# Patient Record
Sex: FEMALE | Race: BLACK OR AFRICAN AMERICAN | NOT HISPANIC OR LATINO | Employment: PART TIME | URBAN - METROPOLITAN AREA
[De-identification: names, ages, dates, MRNs, and addresses within clinical notes are randomized per-mention and may not be internally consistent; named-entity substitution may affect disease eponyms.]

---

## 2023-03-16 PROBLEM — M79.89 LEG SWELLING: Status: ACTIVE | Noted: 2023-03-16

## 2023-03-16 PROBLEM — E78.5 HYPERLIPIDEMIA: Status: ACTIVE | Noted: 2023-03-16

## 2023-03-16 PROBLEM — G62.9 NEUROPATHY: Status: ACTIVE | Noted: 2023-03-16

## 2023-03-16 PROBLEM — I73.9 CLAUDICATION (CMS-HCC): Status: ACTIVE | Noted: 2023-03-16

## 2023-03-16 PROBLEM — E55.9 VITAMIN D DEFICIENCY: Status: ACTIVE | Noted: 2023-03-16

## 2023-03-16 PROBLEM — M79.605 PAIN OF LEFT LOWER EXTREMITY: Status: ACTIVE | Noted: 2023-03-16

## 2023-03-16 PROBLEM — M54.12 CERVICAL RADICULOPATHY: Status: ACTIVE | Noted: 2023-03-16

## 2023-03-16 PROBLEM — G89.29 CHRONIC RIGHT SHOULDER PAIN: Status: ACTIVE | Noted: 2023-03-16

## 2023-03-16 PROBLEM — M25.511 CHRONIC RIGHT SHOULDER PAIN: Status: ACTIVE | Noted: 2023-03-16

## 2023-03-16 PROBLEM — R00.2 HEART PALPITATIONS: Status: ACTIVE | Noted: 2023-03-16

## 2023-03-16 PROBLEM — R25.2 MUSCLE CRAMPING: Status: ACTIVE | Noted: 2023-03-16

## 2023-03-16 PROBLEM — E66.811 CLASS 1 OBESITY DUE TO EXCESS CALORIES WITH SERIOUS COMORBIDITY AND BODY MASS INDEX (BMI) OF 32.0 TO 32.9 IN ADULT: Status: ACTIVE | Noted: 2023-03-16

## 2023-03-16 PROBLEM — N18.31 CHRONIC KIDNEY DISEASE, STAGE 3A (MULTI): Status: ACTIVE | Noted: 2023-03-16

## 2023-03-16 PROBLEM — E66.09 CLASS 1 OBESITY DUE TO EXCESS CALORIES WITH SERIOUS COMORBIDITY AND BODY MASS INDEX (BMI) OF 32.0 TO 32.9 IN ADULT: Status: ACTIVE | Noted: 2023-03-16

## 2023-03-16 PROBLEM — M25.571 PAIN IN RIGHT ANKLE: Status: ACTIVE | Noted: 2023-03-16

## 2023-03-16 PROBLEM — I73.9 PVD (PERIPHERAL VASCULAR DISEASE) WITH CLAUDICATION (CMS-HCC): Status: ACTIVE | Noted: 2023-03-16

## 2023-03-16 PROBLEM — I10 BENIGN ESSENTIAL HTN: Status: ACTIVE | Noted: 2023-03-16

## 2023-03-16 PROBLEM — K50.90 CROHN DISEASE (MULTI): Status: ACTIVE | Noted: 2023-03-16

## 2023-03-16 PROBLEM — M25.512 LEFT SHOULDER PAIN: Status: ACTIVE | Noted: 2023-03-16

## 2023-03-16 PROBLEM — M54.16 LUMBAR RADICULAR PAIN: Status: ACTIVE | Noted: 2023-03-16

## 2023-03-16 RX ORDER — LISINOPRIL AND HYDROCHLOROTHIAZIDE 12.5; 2 MG/1; MG/1
1 TABLET ORAL DAILY
COMMUNITY
Start: 2019-08-02 | End: 2023-05-10 | Stop reason: SDUPTHER

## 2023-03-16 RX ORDER — ATORVASTATIN CALCIUM 80 MG/1
1 TABLET, FILM COATED ORAL DAILY
COMMUNITY
Start: 2019-08-09 | End: 2023-05-17 | Stop reason: SDUPTHER

## 2023-03-16 RX ORDER — FLUTICASONE PROPIONATE 50 MCG
2 SPRAY, SUSPENSION (ML) NASAL DAILY
COMMUNITY
Start: 2021-02-19

## 2023-03-21 ENCOUNTER — APPOINTMENT (OUTPATIENT)
Dept: PRIMARY CARE | Facility: CLINIC | Age: 73
End: 2023-03-21
Payer: COMMERCIAL

## 2023-05-10 DIAGNOSIS — E78.5 HYPERLIPIDEMIA, UNSPECIFIED HYPERLIPIDEMIA TYPE: ICD-10-CM

## 2023-05-10 DIAGNOSIS — I10 BENIGN ESSENTIAL HTN: Primary | ICD-10-CM

## 2023-05-11 ENCOUNTER — TELEMEDICINE (OUTPATIENT)
Dept: PRIMARY CARE | Facility: CLINIC | Age: 73
End: 2023-05-11
Payer: COMMERCIAL

## 2023-05-11 DIAGNOSIS — R21 RASH: Primary | ICD-10-CM

## 2023-05-11 PROCEDURE — 99442 PR PHYS/QHP TELEPHONE EVALUATION 11-20 MIN: CPT | Performed by: NURSE PRACTITIONER

## 2023-05-22 RX ORDER — LISINOPRIL AND HYDROCHLOROTHIAZIDE 12.5; 2 MG/1; MG/1
1 TABLET ORAL DAILY
Qty: 90 TABLET | Refills: 3 | Status: SHIPPED | OUTPATIENT
Start: 2023-05-22 | End: 2023-09-22 | Stop reason: SINTOL

## 2023-05-22 RX ORDER — ATORVASTATIN CALCIUM 80 MG/1
80 TABLET, FILM COATED ORAL DAILY
Qty: 90 TABLET | Refills: 3 | Status: SHIPPED | OUTPATIENT
Start: 2023-05-22

## 2023-06-05 ENCOUNTER — LAB (OUTPATIENT)
Dept: LAB | Facility: LAB | Age: 73
End: 2023-06-05
Payer: COMMERCIAL

## 2023-06-05 ENCOUNTER — OFFICE VISIT (OUTPATIENT)
Dept: PRIMARY CARE | Facility: CLINIC | Age: 73
End: 2023-06-05
Payer: COMMERCIAL

## 2023-06-05 VITALS
TEMPERATURE: 97.3 F | OXYGEN SATURATION: 98 % | WEIGHT: 158.5 LBS | HEART RATE: 62 BPM | BODY MASS INDEX: 31.95 KG/M2 | HEIGHT: 59 IN | SYSTOLIC BLOOD PRESSURE: 118 MMHG | DIASTOLIC BLOOD PRESSURE: 84 MMHG

## 2023-06-05 DIAGNOSIS — Z13.9 ENCOUNTER FOR SCREENING INVOLVING SOCIAL DETERMINANTS OF HEALTH (SDOH): ICD-10-CM

## 2023-06-05 DIAGNOSIS — N18.31 CHRONIC KIDNEY DISEASE, STAGE 3A (MULTI): ICD-10-CM

## 2023-06-05 DIAGNOSIS — I10 BENIGN ESSENTIAL HTN: ICD-10-CM

## 2023-06-05 DIAGNOSIS — I73.9 PVD (PERIPHERAL VASCULAR DISEASE) WITH CLAUDICATION (CMS-HCC): Primary | ICD-10-CM

## 2023-06-05 DIAGNOSIS — K50.919 CROHN'S DISEASE WITH COMPLICATION, UNSPECIFIED GASTROINTESTINAL TRACT LOCATION (MULTI): ICD-10-CM

## 2023-06-05 DIAGNOSIS — Z12.31 ENCOUNTER FOR SCREENING MAMMOGRAM FOR BREAST CANCER: ICD-10-CM

## 2023-06-05 DIAGNOSIS — I83.93 VARICOSE VEINS OF BOTH LOWER EXTREMITIES, UNSPECIFIED WHETHER COMPLICATED: ICD-10-CM

## 2023-06-05 DIAGNOSIS — M25.571 CHRONIC PAIN OF RIGHT ANKLE: ICD-10-CM

## 2023-06-05 DIAGNOSIS — I73.9 PVD (PERIPHERAL VASCULAR DISEASE) WITH CLAUDICATION (CMS-HCC): ICD-10-CM

## 2023-06-05 DIAGNOSIS — L30.9 ECZEMA, UNSPECIFIED TYPE: ICD-10-CM

## 2023-06-05 DIAGNOSIS — G89.29 CHRONIC PAIN OF RIGHT ANKLE: ICD-10-CM

## 2023-06-05 PROBLEM — Z85.42 PERSONAL HISTORY OF MALIGNANT NEOPLASM OF OTHER PARTS OF UTERUS: Status: ACTIVE | Noted: 2023-06-05

## 2023-06-05 LAB
ALBUMIN (G/DL) IN SER/PLAS: 4.2 G/DL (ref 3.4–5)
ANION GAP IN SER/PLAS: 11 MMOL/L (ref 10–20)
BASOPHILS (10*3/UL) IN BLOOD BY AUTOMATED COUNT: 0.02 X10E9/L (ref 0–0.1)
BASOPHILS/100 LEUKOCYTES IN BLOOD BY AUTOMATED COUNT: 0.3 % (ref 0–2)
CALCIUM (MG/DL) IN SER/PLAS: 9.9 MG/DL (ref 8.6–10.6)
CARBON DIOXIDE, TOTAL (MMOL/L) IN SER/PLAS: 30 MMOL/L (ref 21–32)
CHLORIDE (MMOL/L) IN SER/PLAS: 102 MMOL/L (ref 98–107)
CHOLESTEROL (MG/DL) IN SER/PLAS: 231 MG/DL (ref 0–199)
CHOLESTEROL IN HDL (MG/DL) IN SER/PLAS: 102.8 MG/DL
CHOLESTEROL/HDL RATIO: 2.2
CREATININE (MG/DL) IN SER/PLAS: 1.15 MG/DL (ref 0.5–1.05)
EOSINOPHILS (10*3/UL) IN BLOOD BY AUTOMATED COUNT: 0.09 X10E9/L (ref 0–0.4)
EOSINOPHILS/100 LEUKOCYTES IN BLOOD BY AUTOMATED COUNT: 1.5 % (ref 0–6)
ERYTHROCYTE DISTRIBUTION WIDTH (RATIO) BY AUTOMATED COUNT: 12.7 % (ref 11.5–14.5)
ERYTHROCYTE MEAN CORPUSCULAR HEMOGLOBIN CONCENTRATION (G/DL) BY AUTOMATED: 32.6 G/DL (ref 32–36)
ERYTHROCYTE MEAN CORPUSCULAR VOLUME (FL) BY AUTOMATED COUNT: 95 FL (ref 80–100)
ERYTHROCYTES (10*6/UL) IN BLOOD BY AUTOMATED COUNT: 4.6 X10E12/L (ref 4–5.2)
GFR FEMALE: 50 ML/MIN/1.73M2
GLUCOSE (MG/DL) IN SER/PLAS: 94 MG/DL (ref 74–99)
HEMATOCRIT (%) IN BLOOD BY AUTOMATED COUNT: 43.8 % (ref 36–46)
HEMOGLOBIN (G/DL) IN BLOOD: 14.3 G/DL (ref 12–16)
IMMATURE GRANULOCYTES/100 LEUKOCYTES IN BLOOD BY AUTOMATED COUNT: 0.3 % (ref 0–0.9)
LDL: 115 MG/DL (ref 0–99)
LEUKOCYTES (10*3/UL) IN BLOOD BY AUTOMATED COUNT: 5.8 X10E9/L (ref 4.4–11.3)
LYMPHOCYTES (10*3/UL) IN BLOOD BY AUTOMATED COUNT: 2.12 X10E9/L (ref 0.8–3)
LYMPHOCYTES/100 LEUKOCYTES IN BLOOD BY AUTOMATED COUNT: 36.5 % (ref 13–44)
MONOCYTES (10*3/UL) IN BLOOD BY AUTOMATED COUNT: 0.64 X10E9/L (ref 0.05–0.8)
MONOCYTES/100 LEUKOCYTES IN BLOOD BY AUTOMATED COUNT: 11 % (ref 2–10)
NEUTROPHILS (10*3/UL) IN BLOOD BY AUTOMATED COUNT: 2.92 X10E9/L (ref 1.6–5.5)
NEUTROPHILS/100 LEUKOCYTES IN BLOOD BY AUTOMATED COUNT: 50.4 % (ref 40–80)
NRBC (PER 100 WBCS) BY AUTOMATED COUNT: 0 /100 WBC (ref 0–0)
PHOSPHATE (MG/DL) IN SER/PLAS: 3.3 MG/DL (ref 2.5–4.9)
PLATELETS (10*3/UL) IN BLOOD AUTOMATED COUNT: 284 X10E9/L (ref 150–450)
POTASSIUM (MMOL/L) IN SER/PLAS: 4.1 MMOL/L (ref 3.5–5.3)
SODIUM (MMOL/L) IN SER/PLAS: 139 MMOL/L (ref 136–145)
THYROTROPIN (MIU/L) IN SER/PLAS BY DETECTION LIMIT <= 0.05 MIU/L: 2.7 MIU/L (ref 0.44–3.98)
TRIGLYCERIDE (MG/DL) IN SER/PLAS: 66 MG/DL (ref 0–149)
UREA NITROGEN (MG/DL) IN SER/PLAS: 24 MG/DL (ref 6–23)
VLDL: 13 MG/DL (ref 0–40)

## 2023-06-05 PROCEDURE — 36415 COLL VENOUS BLD VENIPUNCTURE: CPT

## 2023-06-05 PROCEDURE — 84443 ASSAY THYROID STIM HORMONE: CPT

## 2023-06-05 PROCEDURE — 1036F TOBACCO NON-USER: CPT | Performed by: FAMILY MEDICINE

## 2023-06-05 PROCEDURE — 99214 OFFICE O/P EST MOD 30 MIN: CPT | Performed by: FAMILY MEDICINE

## 2023-06-05 PROCEDURE — 3079F DIAST BP 80-89 MM HG: CPT | Performed by: FAMILY MEDICINE

## 2023-06-05 PROCEDURE — 1159F MED LIST DOCD IN RCRD: CPT | Performed by: FAMILY MEDICINE

## 2023-06-05 PROCEDURE — 3074F SYST BP LT 130 MM HG: CPT | Performed by: FAMILY MEDICINE

## 2023-06-05 PROCEDURE — 1160F RVW MEDS BY RX/DR IN RCRD: CPT | Performed by: FAMILY MEDICINE

## 2023-06-05 PROCEDURE — 85025 COMPLETE CBC W/AUTO DIFF WBC: CPT

## 2023-06-05 PROCEDURE — 80069 RENAL FUNCTION PANEL: CPT

## 2023-06-05 PROCEDURE — 80061 LIPID PANEL: CPT

## 2023-06-05 RX ORDER — DEXTROMETHORPHAN HYDROBROMIDE, GUAIFENESIN 5; 100 MG/5ML; MG/5ML
650 LIQUID ORAL
COMMUNITY
Start: 2017-03-29

## 2023-06-05 RX ORDER — TRIAMCINOLONE ACETONIDE 1 MG/G
CREAM TOPICAL 2 TIMES DAILY
Qty: 45 G | Refills: 1 | Status: SHIPPED | OUTPATIENT
Start: 2023-06-05 | End: 2023-09-16 | Stop reason: ALTCHOICE

## 2023-06-05 RX ORDER — ASPIRIN 81 MG/1
81 TABLET ORAL DAILY
Qty: 90 TABLET | Refills: 3 | Status: SHIPPED | OUTPATIENT
Start: 2023-06-05 | End: 2024-06-04

## 2023-06-05 ASSESSMENT — PATIENT HEALTH QUESTIONNAIRE - PHQ9
1. LITTLE INTEREST OR PLEASURE IN DOING THINGS: NOT AT ALL
2. FEELING DOWN, DEPRESSED OR HOPELESS: NOT AT ALL
SUM OF ALL RESPONSES TO PHQ9 QUESTIONS 1 AND 2: 0

## 2023-06-05 NOTE — PROGRESS NOTES
CHW met with pt in office to discuss her SDOH referral for education and internet assistance. CHW received pt's permission to sign her up with [employer]. Pt will phone CHW if she need further assistance.

## 2023-06-05 NOTE — PROGRESS NOTES
"Subjective   Patient ID: Katerina Joyner is a 73 y.o. female who presents for Leg Injury (Patient states she is having aches and stiffness in the right leg. Patient states left leg keeps swelling and aching, rash present on foot.).    HPI     Left leg with swelling- worsened causing hard to walk  Right leg w/o swelling- was with ankle pain- worsened pain with walking  Feeling like foot is coming out of the shoe- due to the ankle \"giving away\"  + vaicose veins  Also with right ankle pain - worsened with walking  Feeling numbness with prolonged walking of  B/Llegs    New rash on on the ankle and neck area    Due for mammogram    HTN  BP at goal today in office.  Using medications without issues.  Denies CP, SOB, palpitations, change in vision, dizziness, N/V.        Review of Systems  All systems reviewed and neg if not noted in the HPI above       Objective   /84   Pulse 62   Temp 36.3 °C (97.3 °F)   Ht 1.499 m (4' 11\")   Wt 71.9 kg (158 lb 8 oz)   SpO2 98%   BMI 32.01 kg/m²     Physical Exam  Pleasant  Eyes: conjunctiva non-icteric and eye lids are without obvious rash or drooping. Pupils are symmetric.   Ears, Nose, Mouth, and Throat: External ears and nose appear to be without deformity or rash. No lesions or masses noted. Hearing is grossly intact.   CV: RRR, no murmur  Carotids: no bruits  Pulm:CTA B/L  Abd: soft, NTTP, + BS  LE: traces swelling on th left leg  + varicose veins b/l  Psychiatric: Alert, orientation to person, place, and time. Recent/remote memory as evidenced through face-to-face interaction and discussion appear grossly intact. Mood and affect are normal.        Assessment/Plan   Problem List Items Addressed This Visit          Circulatory    Benign essential HTN    Relevant Orders    Renal function panel    Lipid Panel    CBC and Auto Differential    TSH with reflex to Free T4 if abnormal    Albumin , Urine Random    Urinalysis with Reflex Microscopic    PVD (peripheral vascular " disease) with claudication (CMS/[facility]) - Primary     Has worsened  Referral placed for vascular         Relevant Orders    Referral to Vascular Medicine    Vascular US PVR with exercise    Renal function panel       Genitourinary    Chronic kidney disease, stage 3a     Was stable last year  Rechecking again         Relevant Orders    Renal function panel    Lipid Panel    CBC and Auto Differential    TSH with reflex to Free T4 if abnormal    Albumin , Urine Random    Urinalysis with Reflex Microscopic       Musculoskeletal    Pain in right ankle    Relevant Orders    XR ankle right 3+ views    Referral to Podiatry    Disability Placard       Immune    Crohn disease (CMS/[facility])     Diet controlled  Will continue to monitor         Relevant Orders    Renal function panel     Other Visit Diagnoses       Encounter for screening mammogram for breast cancer        Relevant Orders    BI mammo bilateral screening tomosynthesis    Eczema, unspecified type        Relevant Medications    triamcinolone (Kenalog) 0.1 % cream    Varicose veins of both lower extremities, unspecified whether complicated        Relevant Orders    Referral to Vascular Medicine    Vascular US PVR with exercise                  Please follow up in 3months for medicare wellness or as needed.              Patient was identified as a fall risk. Risk prevention instructions provided.

## 2023-06-06 ENCOUNTER — LAB (OUTPATIENT)
Dept: LAB | Facility: LAB | Age: 73
End: 2023-06-06
Payer: COMMERCIAL

## 2023-06-06 DIAGNOSIS — N18.31 CHRONIC KIDNEY DISEASE, STAGE 3A (MULTI): ICD-10-CM

## 2023-06-06 DIAGNOSIS — I10 BENIGN ESSENTIAL HTN: ICD-10-CM

## 2023-06-06 LAB
ALBUMIN (MG/L) IN URINE: <7 MG/L
ALBUMIN/CREATININE (UG/MG) IN URINE: NORMAL UG/MG CRT (ref 0–30)
CREATININE (MG/DL) IN URINE: 84.8 MG/DL (ref 20–320)

## 2023-06-06 PROCEDURE — 81001 URINALYSIS AUTO W/SCOPE: CPT

## 2023-06-06 PROCEDURE — 82043 UR ALBUMIN QUANTITATIVE: CPT

## 2023-06-06 PROCEDURE — 82570 ASSAY OF URINE CREATININE: CPT

## 2023-06-07 LAB
APPEARANCE, URINE: CLEAR
BILIRUBIN, URINE: NEGATIVE
BLOOD, URINE: NEGATIVE
COLOR, URINE: YELLOW
GLUCOSE, URINE: NEGATIVE MG/DL
KETONES, URINE: NEGATIVE MG/DL
LEUKOCYTE ESTERASE, URINE: NEGATIVE
MUCUS, URINE: NORMAL /LPF
NITRITE, URINE: POSITIVE
PH, URINE: 5 (ref 5–8)
PROTEIN, URINE: NEGATIVE MG/DL
RBC, URINE: <1 /HPF (ref 0–5)
SPECIFIC GRAVITY, URINE: 1.01 (ref 1–1.03)
SQUAMOUS EPITHELIAL CELLS, URINE: 1 /HPF
UROBILINOGEN, URINE: <2 MG/DL (ref 0–1.9)
WBC, URINE: 2 /HPF (ref 0–5)

## 2023-08-31 PROBLEM — I87.2 VENOUS INSUFFICIENCY: Status: ACTIVE | Noted: 2023-08-31

## 2023-08-31 PROBLEM — M79.605 PAIN OF LEFT LOWER EXTREMITY: Status: RESOLVED | Noted: 2023-03-16 | Resolved: 2023-08-31

## 2023-08-31 PROBLEM — M25.512 LEFT SHOULDER PAIN: Status: RESOLVED | Noted: 2023-03-16 | Resolved: 2023-08-31

## 2023-08-31 PROBLEM — R25.2 MUSCLE CRAMPING: Status: RESOLVED | Noted: 2023-03-16 | Resolved: 2023-08-31

## 2023-08-31 PROBLEM — M54.16 LUMBAR RADICULAR PAIN: Status: RESOLVED | Noted: 2023-03-16 | Resolved: 2023-08-31

## 2023-08-31 PROBLEM — Z78.0 ASYMPTOMATIC MENOPAUSE: Status: ACTIVE | Noted: 2023-08-31

## 2023-08-31 PROBLEM — M25.571 PAIN IN RIGHT ANKLE: Status: RESOLVED | Noted: 2023-03-16 | Resolved: 2023-08-31

## 2023-08-31 RX ORDER — UBIDECARENONE 30 MG
1 CAPSULE ORAL DAILY
COMMUNITY
Start: 2023-08-11

## 2023-09-15 ENCOUNTER — TELEPHONE (OUTPATIENT)
Dept: PRIMARY CARE | Facility: CLINIC | Age: 73
End: 2023-09-15

## 2023-09-15 DIAGNOSIS — L30.9 CHRONIC ECZEMA: Primary | ICD-10-CM

## 2023-09-15 NOTE — TELEPHONE ENCOUNTER
Patient requested a refill for the following medication:  triamcinolone (Kenalog) 0.1 % cream     If approved , Please sign and send to pharmacy listed:  [id number] - [facility] [address]  [address] [facility], [facility] [address]  Phone: [phone]  Fax: [phone]     Patient also stated that she believes the cream would work better for her if it was a little stronger    Patient stated that she thinks the rash is still spreading

## 2023-09-16 RX ORDER — TRIAMCINOLONE ACETONIDE 1 MG/G
OINTMENT TOPICAL 2 TIMES DAILY PRN
Qty: 90 G | Refills: 0 | Status: SHIPPED | OUTPATIENT
Start: 2023-09-16 | End: 2024-01-14

## 2023-09-16 NOTE — TELEPHONE ENCOUNTER
Changed to ointment- which is stronger  Referral for derm placed since it is worsened    I sent her [employer] message as well

## 2023-09-22 ENCOUNTER — OFFICE VISIT (OUTPATIENT)
Dept: PRIMARY CARE | Facility: CLINIC | Age: 73
End: 2023-09-22
Payer: COMMERCIAL

## 2023-09-22 VITALS
OXYGEN SATURATION: 98 % | SYSTOLIC BLOOD PRESSURE: 160 MMHG | HEIGHT: 59 IN | DIASTOLIC BLOOD PRESSURE: 80 MMHG | RESPIRATION RATE: 14 BRPM | HEART RATE: 76 BPM | BODY MASS INDEX: 31.55 KG/M2 | TEMPERATURE: 97.8 F | WEIGHT: 156.5 LBS

## 2023-09-22 DIAGNOSIS — I10 BENIGN ESSENTIAL HTN: ICD-10-CM

## 2023-09-22 DIAGNOSIS — G89.29 CHRONIC LEFT SHOULDER PAIN: ICD-10-CM

## 2023-09-22 DIAGNOSIS — G89.29 CHRONIC JOINT PAIN: ICD-10-CM

## 2023-09-22 DIAGNOSIS — M25.512 CHRONIC LEFT SHOULDER PAIN: ICD-10-CM

## 2023-09-22 DIAGNOSIS — E66.9 CLASS 1 OBESITY WITH SERIOUS COMORBIDITY AND BODY MASS INDEX (BMI) OF 31.0 TO 31.9 IN ADULT, UNSPECIFIED OBESITY TYPE: ICD-10-CM

## 2023-09-22 DIAGNOSIS — L30.9 CHRONIC ECZEMA: ICD-10-CM

## 2023-09-22 DIAGNOSIS — Z00.00 ENCOUNTER FOR MEDICARE ANNUAL WELLNESS EXAM: Primary | ICD-10-CM

## 2023-09-22 DIAGNOSIS — M25.50 CHRONIC JOINT PAIN: ICD-10-CM

## 2023-09-22 PROCEDURE — 1170F FXNL STATUS ASSESSED: CPT | Performed by: FAMILY MEDICINE

## 2023-09-22 PROCEDURE — 1036F TOBACCO NON-USER: CPT | Performed by: FAMILY MEDICINE

## 2023-09-22 PROCEDURE — G0447 BEHAVIOR COUNSEL OBESITY 15M: HCPCS | Performed by: FAMILY MEDICINE

## 2023-09-22 PROCEDURE — 1159F MED LIST DOCD IN RCRD: CPT | Performed by: FAMILY MEDICINE

## 2023-09-22 PROCEDURE — 3008F BODY MASS INDEX DOCD: CPT | Performed by: FAMILY MEDICINE

## 2023-09-22 PROCEDURE — G0439 PPPS, SUBSEQ VISIT: HCPCS | Performed by: FAMILY MEDICINE

## 2023-09-22 PROCEDURE — G0444 DEPRESSION SCREEN ANNUAL: HCPCS | Performed by: FAMILY MEDICINE

## 2023-09-22 PROCEDURE — 3079F DIAST BP 80-89 MM HG: CPT | Performed by: FAMILY MEDICINE

## 2023-09-22 PROCEDURE — 1160F RVW MEDS BY RX/DR IN RCRD: CPT | Performed by: FAMILY MEDICINE

## 2023-09-22 PROCEDURE — 99213 OFFICE O/P EST LOW 20 MIN: CPT | Performed by: FAMILY MEDICINE

## 2023-09-22 PROCEDURE — 99397 PER PM REEVAL EST PAT 65+ YR: CPT | Performed by: FAMILY MEDICINE

## 2023-09-22 PROCEDURE — 1125F AMNT PAIN NOTED PAIN PRSNT: CPT | Performed by: FAMILY MEDICINE

## 2023-09-22 PROCEDURE — 3077F SYST BP >= 140 MM HG: CPT | Performed by: FAMILY MEDICINE

## 2023-09-22 RX ORDER — AMLODIPINE BESYLATE 5 MG/1
5 TABLET ORAL DAILY
Qty: 30 TABLET | Refills: 0 | Status: SHIPPED | OUTPATIENT
Start: 2023-09-22 | End: 2023-10-24 | Stop reason: SDUPTHER

## 2023-09-22 RX ORDER — LOSARTAN POTASSIUM 50 MG/1
50 TABLET ORAL DAILY
Qty: 30 TABLET | Refills: 0 | Status: SHIPPED | OUTPATIENT
Start: 2023-09-22 | End: 2023-10-24 | Stop reason: SDUPTHER

## 2023-09-22 ASSESSMENT — PATIENT HEALTH QUESTIONNAIRE - PHQ9
SUM OF ALL RESPONSES TO PHQ9 QUESTIONS 1 AND 2: 0
1. LITTLE INTEREST OR PLEASURE IN DOING THINGS: NOT AT ALL
2. FEELING DOWN, DEPRESSED OR HOPELESS: NOT AT ALL

## 2023-09-22 ASSESSMENT — ACTIVITIES OF DAILY LIVING (ADL)
DRESSING: INDEPENDENT
DOING_HOUSEWORK: INDEPENDENT
TAKING_MEDICATION: INDEPENDENT
BATHING: INDEPENDENT
MANAGING_FINANCES: INDEPENDENT
GROCERY_SHOPPING: INDEPENDENT

## 2023-09-22 NOTE — PROGRESS NOTES
"Subjective   Reason for Visit: Katerina Joyner is an 73 y.o. female here for a Medicare Wellness visit.     Past Medical, Surgical, and Family History reviewed and updated in chart.    Reviewed all medications by prescribing practitioner or clinical pharmacist (such as prescriptions, OTCs, herbal therapies and supplements) and documented in the medical record.    [facility]    Patient Care Team:  Starr Gray DO as PCP - General  Starr Gray DO as PCP - [employer] PCP  Juju Vega LCSW as Care Manager (Case Management)     HTN  BP NOT at goal today in office.  Using medications without issues.  Denies CP, SOB, palpitations, change in vision, dizziness, N/V.   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 In the past seen by ortho and was with injections which helped. Does not want injection at this time. Would like ot work on ROM exercises and stretches for now  Using tylenol and aleve as needed for pain    Review of Systems  All systems reviewed and neg if not noted in the HPI above     Objective   Vitals:  /80   Pulse 76   Temp 36.6 °C (97.8 °F)   Resp 14   Ht 1.499 m (4' 11\")   Wt 71 kg (156 lb 8 oz)   SpO2 98%   BMI 31.61 kg/m²       Physical Exam  Constitutional: Well-nourished sitting on chair  Eyes: eye lids are without obvious rash or drooping.   Ears, Nose, Mouth, and Throat:  appear to be without deformity or rash. No lesions or masses noted. Hearing is grossly intact.   Respiratory: No gasping or shortness of breath noted, no use of accessory muscles noted.   Skin: No obvious rashes or lesions  identified on skin.   Psychiatric: Alert, orientation to person, place, and time. Recent/remote memory as evidenced " through face-to-face interaction and discussion appear grossly intact.   Mood and affect are normal.       Assessment/Plan   Katerina was seen today for medicare annual wellness visit subsequent.  Diagnoses and all orders for this visit:  Encounter for Medicare annual wellness exam (Primary)  Chronic eczema  -     Referral to Dermatology  Benign essential HTN  - Your blood pressure is NOT at goal today- goal is less than 130/80  -thinking with lisinopril/ hydrochlorothiazide causing some itching  - to stop the above to see if this helps  - Start on Losartan 50mg and Amlodipine 5mg  - Weight loss can help lower your bp!  Work on a healthy whole food diet and add at least 30min of exercise 5 days per week  - Work on a low salt diet   - labs today  -     Renal function panel; Future  -     losartan (Cozaar) 50 mg tablet; Take 1 tablet (50 mg) by mouth once daily.  -     amLODIPine (Norvasc) 5 mg tablet; Take 1 tablet (5 mg) by mouth once daily.  Class 1 obesity with serious comorbidity and body mass index (BMI) of 31.0 to 31.9 in adult, unspecified obesity type  Continue to work on healthy diet and exercise  We encourage all patients to engage in exercise 150 minutes per week   Adults 18 and older, activity during each week - if you are able:    Engage in at least 150 minutes of moderate or vigorous exercise per week   If you are able- Engage in muscle strengthening activity on 2 or more days per week  Chronic joint pain  - home exercise  - Topical creams- Voltaren gel, Salonpas, Icy hot, Bio freeze, Capsaicin, Asper cream, or Tiger balm (these are all over the counter)   - Tylenol 1000 every 6hrs as needed for pain  - Avoid NSAIDS for now to help with your kidney fxn  Chronic left shoulder pain  - home exercise  - Topical creams- Voltaren gel, Salonpas, Icy hot, Bio freeze, Capsaicin, Asper cream, or Tiger balm (these are all over the counter)   - Tylenol 1000 every 6hrs as needed for pain  - Avoid NSAIDS for now to  help with your kidney fxn       Please follow up in Mon or tue for BP check with nurse clinic and 1 yr for medicare wellness or as needed.

## 2023-09-22 NOTE — PATIENT INSTRUCTIONS
COVID and RSV- can get from pharm  Declined flu    HTN  - Your blood pressure is NOT at goal today- goal is less than 130/80  -thinking with lisinopril/ hydrochlorothiazide causing some itching  - to stop the above to see if this helps  - Start on Losartan 50mg and Amlodipine 5mg  - Weight loss can help lower your bp!  Work on a healthy whole food diet and add at least 30min of exercise 5 days per week  - Work on a low salt diet   - labs today      Chronic joint pain  - home exercise  - Topical creams- Voltaren gel, Salonpas, Icy hot, Bio freeze, Capsaicin, Asper cream, or Tiger balm (these are all over the counter)   - Tylenol 1000 every 6hrs as needed for pain  - Avoid NSAIDS for now to help with your kidney fxn      Continue to work on healthy diet and exercise  We encourage all patients to engage in exercise 150 minutes per week   Adults 18 and older, activity during each week - if you are able:    Engage in at least 150 minutes of moderate or vigorous exercise per week   If you are able- Engage in muscle strengthening activity on 2 or more days per week      Please follow up in Mon or tue for BP check with nurse clinic and 1 yr for medicare wellness or as needed.       ** If labs or imaging ordered at today's visit, all the non-urgent results will be discussed at your next visit    If you have been referred for a special test or to a specialist please call  [phone] to schedule an appointment.  If you have any further questions, or if develop new or worsened symptoms, please give our office a call at [phone].

## 2023-09-25 ENCOUNTER — LAB (OUTPATIENT)
Dept: LAB | Facility: LAB | Age: 73
End: 2023-09-25
Payer: COMMERCIAL

## 2023-09-25 DIAGNOSIS — I10 BENIGN ESSENTIAL HTN: ICD-10-CM

## 2023-09-25 LAB
ALBUMIN (G/DL) IN SER/PLAS: 4.2 G/DL (ref 3.4–5)
ANION GAP IN SER/PLAS: 12 MMOL/L (ref 10–20)
CALCIUM (MG/DL) IN SER/PLAS: 9.4 MG/DL (ref 8.6–10.6)
CARBON DIOXIDE, TOTAL (MMOL/L) IN SER/PLAS: 30 MMOL/L (ref 21–32)
CHLORIDE (MMOL/L) IN SER/PLAS: 105 MMOL/L (ref 98–107)
CREATININE (MG/DL) IN SER/PLAS: 0.92 MG/DL (ref 0.5–1.05)
GFR FEMALE: 66 ML/MIN/1.73M2
GLUCOSE (MG/DL) IN SER/PLAS: 92 MG/DL (ref 74–99)
PHOSPHATE (MG/DL) IN SER/PLAS: 2.5 MG/DL (ref 2.5–4.9)
POTASSIUM (MMOL/L) IN SER/PLAS: 4.1 MMOL/L (ref 3.5–5.3)
SODIUM (MMOL/L) IN SER/PLAS: 143 MMOL/L (ref 136–145)
UREA NITROGEN (MG/DL) IN SER/PLAS: 19 MG/DL (ref 6–23)

## 2023-09-25 PROCEDURE — 36415 COLL VENOUS BLD VENIPUNCTURE: CPT

## 2023-09-25 PROCEDURE — 80069 RENAL FUNCTION PANEL: CPT

## 2023-10-09 ENCOUNTER — OFFICE VISIT (OUTPATIENT)
Dept: PODIATRY | Facility: CLINIC | Age: 73
End: 2023-10-09
Payer: COMMERCIAL

## 2023-10-09 DIAGNOSIS — M25.571 ARTHRALGIA OF RIGHT FOOT: ICD-10-CM

## 2023-10-09 DIAGNOSIS — M77.41 METATARSALGIA OF BOTH FEET: ICD-10-CM

## 2023-10-09 DIAGNOSIS — M79.672 BILATERAL FOOT PAIN: Primary | ICD-10-CM

## 2023-10-09 DIAGNOSIS — M79.671 BILATERAL FOOT PAIN: Primary | ICD-10-CM

## 2023-10-09 DIAGNOSIS — M77.42 METATARSALGIA OF BOTH FEET: ICD-10-CM

## 2023-10-09 DIAGNOSIS — M25.571 SINUS TARSI SYNDROME OF RIGHT ANKLE: ICD-10-CM

## 2023-10-09 PROCEDURE — 1160F RVW MEDS BY RX/DR IN RCRD: CPT | Performed by: PODIATRIST

## 2023-10-09 PROCEDURE — 99202 OFFICE O/P NEW SF 15 MIN: CPT | Performed by: PODIATRIST

## 2023-10-09 PROCEDURE — 1159F MED LIST DOCD IN RCRD: CPT | Performed by: PODIATRIST

## 2023-10-09 PROCEDURE — 1036F TOBACCO NON-USER: CPT | Performed by: PODIATRIST

## 2023-10-09 PROCEDURE — 1125F AMNT PAIN NOTED PAIN PRSNT: CPT | Performed by: PODIATRIST

## 2023-10-09 PROCEDURE — 3008F BODY MASS INDEX DOCD: CPT | Performed by: PODIATRIST

## 2023-10-09 NOTE — PROGRESS NOTES
This is a 73 y.o. female new patient for foot pain    History of Present Illness:   Patient states they are here for foot exam  Denies NTB to feet  Tender when walking  Has pain to bottom of foot  No trauma noted    Past Medical History  Past Medical History:   Diagnosis Date    Chronic kidney disease, stage 3a (CMS/HCC) 07/15/2021    Chronic kidney disease, stage 3a    Other obesity due to excess calories 05/13/2021    Class 1 obesity due to excess calories with serious comorbidity and body mass index (BMI) of 33.0 to 33.9 in adult    Other specified disorders of nose and nasal sinuses 02/19/2021    Nasal sore    Other specified soft tissue disorders 10/04/2019    Swelling of left lower extremity    Patient's noncompliance with other medical treatment and regimen due to unspecified reason 04/17/2020    Failure to attend appointment    Personal history of other diseases of the circulatory system     History of hypertension    Personal history of other diseases of the respiratory system 02/19/2021    History of paranasal sinus congestion    Personal history of other specified conditions 05/13/2021    History of dizziness    Personal history of other specified conditions 08/02/2019    History of chronic fatigue       Medications and Allergies have been reviewed.    Review Of Systems:  GENERAL: No weight loss, malaise or fevers.  HEENT: Negative for frequent or significant headaches,   RESPIRATORY: Negative for cough, wheezing or shortness of breath.  CARDIOVASCULAR: Negative for chest pain, leg swelling or palpitations.    Physical Exam:  Patient is a pleasant, cooperative, well developed 73 y.o.  adult female. The patient is alert and oriented to time, place and person.   Patient has normal affect and mood.    Examination of Both Lower Extremities:   Objective:   Vasc: DP and PT pulses are palpable bilateral.  CFT is less than 3 seconds bilateral.  Skin temperature is warm to cool proximal to distal bilateral.   +Varicosities    Neuro: Vibratory, light touch and proprioception are intact bilateral.    Derm: Nails 1-5 bilateral are intact.  Skin is supple with normal texture and turgor noted.  Webspaces are clean, dry and intact bilateral.  There are no hyperkeratoses, ulcerations, verruca or other lesions noted.      Ortho: Muscle strength is 5/5 for all pedal groups tested. Pes cavus foot type noted. Pain to met heads, no edema, erythema or ecchymosis noted. Pain to R sinus tarsi.        A comprehensive history and physical exam was performed. The patient was educated on clinical and radiographic findings, diagnosis, and treatment plans.    1. Bilateral foot pain        2. 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 Metatarsalgia      Patient educated on proper foot care.  Discussed foot pain signs and symptoms  Discussed arthritic type changes.   Discussed stiff shoe gear  No walking barefoot  Shoes that do not twist or bend.   Nsaids prn pain  Ice and tono combs prn pain.   Patient to follow up in 6 mos or sooner if any problems arise.   Patient was in agreement to this plan. All questions answered.      Janeth Acosta DPM  [phone]  [address]  Fax: [phone]

## 2023-10-24 DIAGNOSIS — I10 BENIGN ESSENTIAL HTN: ICD-10-CM

## 2023-10-24 RX ORDER — AMLODIPINE BESYLATE 5 MG/1
5 TABLET ORAL DAILY
Qty: 90 TABLET | Refills: 0 | Status: SHIPPED | OUTPATIENT
Start: 2023-10-24 | End: 2024-01-25 | Stop reason: SDUPTHER

## 2023-10-24 RX ORDER — LOSARTAN POTASSIUM 50 MG/1
50 TABLET ORAL DAILY
Qty: 90 TABLET | Refills: 0 | Status: SHIPPED | OUTPATIENT
Start: 2023-10-24 | End: 2024-01-25 | Stop reason: SDUPTHER

## 2023-10-24 NOTE — TELEPHONE ENCOUNTER
Please call patient.  They need a follow up appointment BP check due to uncontrolled HTN   - please schedule with nurse clinic      Thanks!    Dr. Gray

## 2023-10-30 ENCOUNTER — CLINICAL SUPPORT (OUTPATIENT)
Dept: PRIMARY CARE | Facility: CLINIC | Age: 73
End: 2023-10-30
Payer: COMMERCIAL

## 2023-10-30 VITALS — SYSTOLIC BLOOD PRESSURE: 128 MMHG | DIASTOLIC BLOOD PRESSURE: 70 MMHG

## 2023-10-30 DIAGNOSIS — I10 BENIGN ESSENTIAL HTN: ICD-10-CM

## 2023-10-30 NOTE — PROGRESS NOTES
Patient is here for a blood pressure check. Patient states she is currently taking Amlodipine 5 mg and Losartan 50 mg. Her bp reading was 128/70. Advised patient to follow up as scheduled with her pcp.

## 2023-11-10 ENCOUNTER — APPOINTMENT (OUTPATIENT)
Dept: VASCULAR MEDICINE | Facility: HOSPITAL | Age: 73
End: 2023-11-10
Payer: COMMERCIAL

## 2023-11-10 ENCOUNTER — APPOINTMENT (OUTPATIENT)
Dept: VASCULAR SURGERY | Facility: HOSPITAL | Age: 73
End: 2023-11-10
Payer: COMMERCIAL

## 2023-12-22 ENCOUNTER — HOSPITAL ENCOUNTER (OUTPATIENT)
Dept: VASCULAR MEDICINE | Facility: HOSPITAL | Age: 73
Discharge: HOME | End: 2023-12-22
Payer: COMMERCIAL

## 2023-12-22 ENCOUNTER — APPOINTMENT (OUTPATIENT)
Dept: VASCULAR SURGERY | Facility: HOSPITAL | Age: 73
End: 2023-12-22
Payer: COMMERCIAL

## 2023-12-22 DIAGNOSIS — M79.89 OTHER SPECIFIED SOFT TISSUE DISORDERS: ICD-10-CM

## 2023-12-22 DIAGNOSIS — M79.605 PAIN IN LEFT LEG: ICD-10-CM

## 2023-12-22 DIAGNOSIS — I87.2 VENOUS INSUFFICIENCY (CHRONIC) (PERIPHERAL): ICD-10-CM

## 2023-12-22 DIAGNOSIS — R60.0 LOCALIZED EDEMA: ICD-10-CM

## 2023-12-22 PROCEDURE — 93970 EXTREMITY STUDY: CPT | Performed by: INTERNAL MEDICINE

## 2023-12-22 PROCEDURE — 93970 EXTREMITY STUDY: CPT

## 2023-12-27 ENCOUNTER — TELEMEDICINE (OUTPATIENT)
Dept: VASCULAR SURGERY | Facility: HOSPITAL | Age: 73
End: 2023-12-27
Payer: COMMERCIAL

## 2023-12-27 DIAGNOSIS — I87.2 VENOUS INSUFFICIENCY: Primary | ICD-10-CM

## 2023-12-27 PROCEDURE — 99213 OFFICE O/P EST LOW 20 MIN: CPT | Performed by: NURSE PRACTITIONER

## 2023-12-27 NOTE — PROGRESS NOTES
Vascular Surgery Clinic Note    CC: [facility] telehealth     History Of Present Illness:   Katerina Joyner is a 73 y.o. female being seen as telephone visit for routine follow up of her leg swelling. The swelling is primarily in the left leg and is worse at the end of the day. She has associated leg discomfort with walking. She has only been wearing her compression stockings 2-3 times a week. She does report improvement in her symptoms with use of the compression stockings.     VI study notable for reflux in bilateral common femoral veins.     Medical History:  Patient Active Problem List   Diagnosis    Benign essential HTN    Cervical radiculopathy    Chronic kidney disease, stage 3a (CMS/HCC)    Chronic right shoulder pain    Claudication (CMS/HCC)    Crohn disease (CMS/HCC)    Heart palpitations    Hyperlipidemia    Leg swelling    Neuropathy    PVD (peripheral vascular disease) with claudication (CMS/HCC)    Vitamin D deficiency    Class 1 obesity due to excess calories with serious comorbidity and body mass index (BMI) of 32.0 to 32.9 in adult    Alcohol abuse    Esophageal reflux    Personal history of malignant neoplasm of other parts of uterus    Tobacco use disorder    Venous insufficiency    Asymptomatic menopause        SH:    Social Determinants of Health     Tobacco Use: Low Risk  (10/9/2023)    Patient History     Smoking Tobacco Use: Never     Smokeless Tobacco Use: Never     Passive Exposure: Not on file   Alcohol Use: Not on file   Financial Resource Strain: Not on file   Food Insecurity: Not on file   Transportation Needs: Not on file   Physical Activity: Not on file   Stress: Not on file   Social Connections: Not on file   Intimate Partner Violence: Not on file   Depression: Not at risk (9/22/2023)    PHQ-2     PHQ-2 Score: 0   Housing Stability: Not on file   Utilities: Not on file   Digital Equity: Not on file        FH:  Family History   Problem Relation Name Age of Onset    Hypertension Mother       Colon cancer Father          Allergies:   Allergies   Allergen Reactions    Codeine Unknown     VOMITING       ROS:  All systems were reviewed and noted to be negative, other than described above.     Objective:  Last Recorded Vitals  There were no vitals taken for this visit.    Meds:   Current Outpatient Medications   Medication Instructions    acetaminophen (TYLENOL 8 HOUR) 650 mg, oral    amLODIPine (NORVASC) 5 mg, oral, Daily    aspirin 81 mg, oral, Daily    atorvastatin (LIPITOR) 80 mg, oral, Daily    fluticasone (Flonase) 50 mcg/actuation nasal spray 2 sprays, Each Nostril, Daily    losartan (COZAAR) 50 mg, oral, Daily    mv-min-folic acid-lutein (Essential Woman 50 Plus) 0.4-250 mg-mcg tablet 1 tablet, oral, Daily    triamcinolone (Kenalog) 0.1 % ointment Topical, 2 times daily PRN       Exam:  Unable to obtain - virtual visit     Imaging Reviewed:  Vascular US lower extremity venous insufficiency bilateral 12/22/2023    [place] [address]  Tel [phone] and Fax [phone]      Vascular Lab Report  [facility] US LOWER EXTREMITY VENOUS INSUFFICIENCY BILATERAL      Patient Name:      BA RAY          Reading Physician:  [id number] Maciej Cuevas MD, RPVI  Study Date:        12/22/2023           Ordering Physician: [id number] ADDIE COLE  MRN/PID:           [id number]             Technologist:       Katherin Benavides RVT  Accession#:        [id number]         Technologist 2:  Date of Birth/Age: 1950 / 73 years Encounter#:         [id number]  Gender:            F  Admission Status:  Outpatient           Location Performed: [facility]      Diagnosis/ICD:    Localized (leg) edema-R60.0  CPT Codes:        45818 Venous reflux study VV VI complete  Patient Position: Study performed in a reverse Trendelenburg position.      CONCLUSIONS:  Right Lower Venous Insufficiency: There is reflux noted in the common femoral vein. The small saphenous vein is  continuous with the vein of Giacomini.  Left Lower Venous Insufficiency: There is reflux noted in the common femoral vein. The small saphenous vein is continuous with the vein of Giacomini.  Right Lower Venous: No evidence of acute deep vein thrombus visualized in the right lower extremity. There are chronic changes visualized in the proximal calf great saphenous and mid calf great saphenous.  Left Lower Venous: No evidence of acute deep vein thrombus visualized in the left lower extremity. Enlarged lymph nodes are noted in the groin. There are chronic changes visualized in the proximal calf GSV and mid calf GSV.    Imaging & Doppler Findings:    Right          Compress Thrombus   Time  SFJ              Yes      None  Prox Thigh GSV   Yes      None  Mid Thigh GSV    Yes      None  Knee GSV         Yes      None  Prox Calf GSV    Yes    Fibrotic  Mid Calf GSV     Yes    Fibrotic  Dist Calf GSV    Yes      None  SSV Prox         Yes      None  SSV Mid          Yes      None  SSV Distal       Yes      None  Common FV                        0.70 sec      Left           Compress Thrombus   Time  SFJ              Yes      None  Prox Thigh GSV   Yes      None  Mid Thigh GSV    Yes      None  Knee GSV         Yes      None  Prox Calf GSV    Yes    Fibrotic  Mid Calf GSV     Yes    Fibrotic  Dist Calf GSV    Yes      None  SSV Prox         Yes      None  SSV Mid          Yes      None  SSV Distal       Yes      None  Common FV                        0.50 sec      Right                 Compressible Thrombus        Flow  Distal External Iliac                None  CFV                       Yes        None   Spontaneous/Phasic  PFV                       Yes        None  FV Proximal               Yes        None   Spontaneous/Phasic  FV Mid                    Yes        None  FV Distal                 Yes        None  Popliteal                 Yes        None   Spontaneous/Phasic  Peroneal                  Yes        None  PTV                        Yes        None      Left                  Compress Thrombus        Flow  Distal External Iliac            None  CFV                     Yes      None   Spontaneous/Phasic  PFV                     Yes      None  FV Proximal             Yes      None   Spontaneous/Phasic  FV Mid                  Yes      None  FV Distal               Yes      None  Popliteal               Yes      None   Spontaneous/Phasic  Peroneal                Yes      None  PTV                     Yes      None      [id number] Maciej Cuevas MD, RPVI  Electronically signed by [id number] Maciej Cuevas MD, RPMARTIR on 12/22/2023 at 11:11:22 AM        ** Final **      Assessment & Plan:  1. Venous insufficiency          Venous insufficiency.   Left leg symptomatic.   - continue compression stockings, encouraged daily use   - referral to vein center with Dr. Michelle Mcdaniel, APRN-CNP

## 2024-01-25 DIAGNOSIS — I10 BENIGN ESSENTIAL HTN: ICD-10-CM

## 2024-01-30 RX ORDER — AMLODIPINE BESYLATE 5 MG/1
5 TABLET ORAL DAILY
Qty: 90 TABLET | Refills: 1 | Status: SHIPPED | OUTPATIENT
Start: 2024-01-30 | End: 2025-01-24

## 2024-01-30 RX ORDER — LOSARTAN POTASSIUM 50 MG/1
50 TABLET ORAL DAILY
Qty: 90 TABLET | Refills: 1 | Status: SHIPPED | OUTPATIENT
Start: 2024-01-30 | End: 2025-01-24

## 2024-04-03 PROBLEM — Z91.199 FAILURE TO ATTEND APPOINTMENT: Status: ACTIVE | Noted: 2024-04-03

## 2024-04-03 PROBLEM — R53.83 FATIGUE: Status: ACTIVE | Noted: 2024-04-03

## 2024-04-03 PROBLEM — L30.9 ECZEMA: Status: ACTIVE | Noted: 2024-04-03

## 2024-04-03 PROBLEM — M79.671 PAIN IN BOTH FEET: Status: ACTIVE | Noted: 2024-04-03

## 2024-04-03 PROBLEM — R09.81 CONGESTION OF PARANASAL SINUS: Status: ACTIVE | Noted: 2024-04-03

## 2024-04-03 PROBLEM — R60.0 LOCALIZED EDEMA: Status: ACTIVE | Noted: 2024-04-03

## 2024-04-03 PROBLEM — M79.9 DISORDER OF SOFT TISSUE: Status: ACTIVE | Noted: 2024-04-03

## 2024-04-03 PROBLEM — M77.41 METATARSALGIA OF BOTH FEET: Status: ACTIVE | Noted: 2024-04-03

## 2024-04-03 PROBLEM — M77.42 METATARSALGIA OF BOTH FEET: Status: ACTIVE | Noted: 2024-04-03

## 2024-04-03 PROBLEM — M25.50 ARTHRALGIA: Status: ACTIVE | Noted: 2024-04-03

## 2024-04-03 PROBLEM — Z86.79 HISTORY OF HYPERTENSION: Status: ACTIVE | Noted: 2024-04-03

## 2024-04-03 PROBLEM — M25.571 ARTHRALGIA OF RIGHT FOOT: Status: ACTIVE | Noted: 2023-06-08

## 2024-04-03 PROBLEM — I83.93 VARICOSE VEINS OF BOTH LOWER EXTREMITIES: Status: ACTIVE | Noted: 2023-08-11

## 2024-04-03 PROBLEM — M79.672 PAIN IN BOTH FEET: Status: ACTIVE | Noted: 2024-04-03

## 2024-04-03 PROBLEM — R42 DIZZINESS: Status: ACTIVE | Noted: 2024-04-03

## 2024-04-03 RX ORDER — LIDOCAINE AND PRILOCAINE 25; 25 MG/G; MG/G
CREAM TOPICAL
COMMUNITY
Start: 2020-04-17

## 2024-04-03 RX ORDER — MELOXICAM 15 MG/1
15 TABLET ORAL
COMMUNITY
Start: 2021-02-19

## 2024-04-04 ENCOUNTER — APPOINTMENT (OUTPATIENT)
Dept: VASCULAR SURGERY | Facility: HOSPITAL | Age: 74
End: 2024-04-04
Payer: COMMERCIAL

## 2024-05-02 ENCOUNTER — OFFICE VISIT (OUTPATIENT)
Dept: VASCULAR SURGERY | Facility: HOSPITAL | Age: 74
End: 2024-05-02
Payer: COMMERCIAL

## 2024-05-02 VITALS
HEIGHT: 60 IN | SYSTOLIC BLOOD PRESSURE: 135 MMHG | BODY MASS INDEX: 31.53 KG/M2 | DIASTOLIC BLOOD PRESSURE: 81 MMHG | OXYGEN SATURATION: 97 % | RESPIRATION RATE: 16 BRPM | HEART RATE: 70 BPM | WEIGHT: 160.6 LBS

## 2024-05-02 DIAGNOSIS — R60.0 EDEMA OF LEFT LOWER EXTREMITY: Primary | ICD-10-CM

## 2024-05-02 PROCEDURE — 1159F MED LIST DOCD IN RCRD: CPT | Performed by: NURSE PRACTITIONER

## 2024-05-02 PROCEDURE — 99214 OFFICE O/P EST MOD 30 MIN: CPT | Performed by: NURSE PRACTITIONER

## 2024-05-02 PROCEDURE — 99204 OFFICE O/P NEW MOD 45 MIN: CPT | Performed by: NURSE PRACTITIONER

## 2024-05-02 PROCEDURE — 1036F TOBACCO NON-USER: CPT | Performed by: NURSE PRACTITIONER

## 2024-05-02 PROCEDURE — 3075F SYST BP GE 130 - 139MM HG: CPT | Performed by: NURSE PRACTITIONER

## 2024-05-02 PROCEDURE — 3078F DIAST BP <80 MM HG: CPT | Performed by: NURSE PRACTITIONER

## 2024-05-02 PROCEDURE — 3008F BODY MASS INDEX DOCD: CPT | Performed by: NURSE PRACTITIONER

## 2024-05-02 ASSESSMENT — ENCOUNTER SYMPTOMS
RESPIRATORY NEGATIVE: 1
GASTROINTESTINAL NEGATIVE: 1
CONSTITUTIONAL NEGATIVE: 1
ENDOCRINE NEGATIVE: 1
PSYCHIATRIC NEGATIVE: 1
ALLERGIC/IMMUNOLOGIC NEGATIVE: 1
MUSCULOSKELETAL NEGATIVE: 1
SHORTNESS OF BREATH: 0
PALPITATIONS: 0
NEUROLOGICAL NEGATIVE: 1
EYES NEGATIVE: 1

## 2024-05-02 ASSESSMENT — COLUMBIA-SUICIDE SEVERITY RATING SCALE - C-SSRS
2. HAVE YOU ACTUALLY HAD ANY THOUGHTS OF KILLING YOURSELF?: NO
6. HAVE YOU EVER DONE ANYTHING, STARTED TO DO ANYTHING, OR PREPARED TO DO ANYTHING TO END YOUR LIFE?: NO
1. IN THE PAST MONTH, HAVE YOU WISHED YOU WERE DEAD OR WISHED YOU COULD GO TO SLEEP AND NOT WAKE UP?: NO

## 2024-05-02 ASSESSMENT — PATIENT HEALTH QUESTIONNAIRE - PHQ9
2. FEELING DOWN, DEPRESSED OR HOPELESS: NOT AT ALL
1. LITTLE INTEREST OR PLEASURE IN DOING THINGS: NOT AT ALL
SUM OF ALL RESPONSES TO PHQ9 QUESTIONS 1 AND 2: 0

## 2024-05-02 NOTE — PROGRESS NOTES
NPV REASON: left leg swelling    CURRENT ENCOUNTER:  Katerina Joyner is 74 y.o. female here for left leg swelling about 1 year. Denies trauma, no pelvic varicose veins or pain. Denies any recent wt loss, notes some general weight gain since the COVID-19 pandemic.     Left leg, heavy, ache which is constant rates the pain a 5/10 described as a thromb, denies numbness or tingling. This pain and swelling does effect her quality of day to day life.     She does wear her medical compression stockings daily    Denies hx DVT/phlebitis  No previous surgeries or procedures to legs  Denies kidney, lung, heart or liver failure    LEFT LEG C1 Telangiectasias or reticular veins and C3 Edema  LEFT LEG PAIN 2, VENOUS EDEMA 2, and USE OF COMPRESSION 3  LEFT LEG CEAP: C3  LEFT LEG VCSS SCORE: 7    FH: Father [place]  Social: denies tobacco, drinks 2 glasses of wine, no illicit drug use, retired      PastMedHX:  Past Medical History:   Diagnosis Date    Chronic kidney disease, stage 3a (Multi) 07/15/2021    Chronic kidney disease, stage 3a    Other obesity due to excess calories 05/13/2021    Class 1 obesity due to excess calories with serious comorbidity and body mass index (BMI) of 33.0 to 33.9 in adult    Other specified disorders of nose and nasal sinuses 02/19/2021    Nasal sore    Other specified soft tissue disorders 10/04/2019    Swelling of left lower extremity    Patient's noncompliance with other medical treatment and regimen due to unspecified reason 04/17/2020    Failure to attend appointment    Personal history of other diseases of the circulatory system     History of hypertension    Personal history of other diseases of the respiratory system 02/19/2021    History of paranasal sinus congestion    Personal history of other specified conditions 05/13/2021    History of dizziness    Personal history of other specified conditions 08/02/2019    History of chronic fatigue     Meds:     Current Outpatient Medications:      acetaminophen (Tylenol 8 Hour) 650 mg ER tablet, Take 1 tablet (650 mg) by mouth., Disp: , Rfl:     amLODIPine (Norvasc) 5 mg tablet, Take 1 tablet (5 mg) by mouth once daily., Disp: 90 tablet, Rfl: 1    aspirin 81 mg EC tablet, Take 1 tablet (81 mg) by mouth once daily., Disp: 90 tablet, Rfl: 3    atorvastatin (Lipitor) 80 mg tablet, Take 1 tablet (80 mg) by mouth once daily., Disp: 90 tablet, Rfl: 3    fluticasone (Flonase) 50 mcg/actuation nasal spray, Administer 2 sprays into each nostril once daily., Disp: , Rfl:     lidocaine-prilocaine (Emla) 2.5-2.5 % cream, Administer into affected eye(s)., Disp: , Rfl:     losartan (Cozaar) 50 mg tablet, Take 1 tablet (50 mg) by mouth once daily., Disp: 90 tablet, Rfl: 1    meloxicam (Mobic) 15 mg tablet, Take 1 tablet (15 mg) by mouth once daily., Disp: , Rfl:     mv-min-folic acid-lutein (Essential Woman 50 Plus) 0.4-250 mg-mcg tablet, Take 1 tablet by mouth once daily., Disp: , Rfl:     Allergies:   Allergies   Allergen Reactions    Codeine Unknown     VOMITING     ROS:  Review of Systems   Constitutional: Negative.    HENT: Negative.     Eyes: Negative.    Respiratory: Negative.  Negative for shortness of breath.    Cardiovascular:  Positive for leg swelling. Negative for chest pain and palpitations.        Left leg swelling   Gastrointestinal: Negative.    Endocrine: Negative.    Genitourinary: Negative.    Musculoskeletal: Negative.    Skin: Negative.    Allergic/Immunologic: Negative.    Neurological: Negative.    Psychiatric/Behavioral: Negative.          Objective:  Vitals:  There were no vitals filed for this visit.     Physical Exam  Constitutional:       Appearance: Normal appearance.   HENT:      Head: Normocephalic and atraumatic.      Nose: Nose normal.      Mouth/Throat:      Mouth: Mucous membranes are moist.   Eyes:      Conjunctiva/sclera: Conjunctivae normal.   Cardiovascular:      Rate and Rhythm: Normal rate.      Pulses: Normal pulses.   Pulmonary:       Effort: Pulmonary effort is normal.   Musculoskeletal:         General: Normal range of motion.      Cervical back: Normal range of motion.      Left lower leg: Edema present.      Comments: LLE nonpitting edema   Skin:     General: Skin is warm and dry.      Capillary Refill: Capillary refill takes less than 2 seconds.   Neurological:      General: No focal deficit present.      Mental Status: She is alert and oriented to person, place, and time.     Labs:  Lab Results   Component Value Date    WBC 5.8 06/05/2023    WBC 6.0 12/01/2021    WBC 6.2 07/01/2021    HGB 14.3 06/05/2023    HGB 14.3 12/01/2021    HGB 14.3 07/01/2021    HCT 43.8 06/05/2023    HCT 43.8 12/01/2021    HCT 44.7 07/01/2021    MCV 95 06/05/2023    MCV 98 12/01/2021    MCV 99 07/01/2021     06/05/2023     Lab Results   Component Value Date    CREATININE 0.92 09/25/2023    CREATININE 1.15 (H) 06/05/2023    CREATININE 1.02 12/01/2021    BUN 19 09/25/2023    BUN 24 (H) 06/05/2023    BUN 18 12/01/2021     09/25/2023     06/05/2023     12/01/2021    K 4.1 09/25/2023    K 4.1 06/05/2023    K 4.4 12/01/2021     09/25/2023     06/05/2023     12/01/2021    CO2 30 09/25/2023    CO2 30 06/05/2023    CO2 30 12/01/2021       Imaging:            Assessment & Plan:  Katerina Ray is 74 y.o. female who presented to vascular surgery, vein center today for left leg swelling. She first noticed it about 1 year ago. The compression stockings to help with her symptoms.     A VI was completed which was <1 sec. No venous insuff. No varicose veins noted on exam.     It was a pleasure taking care of you today and appreciate your seeing us at our [facility].     Today's plan is as follows:  1) Continue to wear medical grade compression stockings during the day  2) Elevate your legs at rest  3) No LLE venous procedure to complete at this time, the patient has a great pulse and  no varicose veins. She may follow along with primary care for future needs    Please call the office with any questions at [phone].   You can speak to our secretaries or our clinical nurses for specific questions.   For Vein Center specific questions, you can also call [phone] or email at [email or web address]  If you need coordinating your appointments and testing you can do these at the  or by calling my office shortly after your visit.    Yoly Murphy DNP, APRN-CNP, AGPCNP-C, FNP-C, AGACNP-BC  Senior Nurse Practitioner, Vascular Surgery  [facility], [facility] [place]  [address], Office [phone]

## 2024-05-02 NOTE — PATIENT INSTRUCTIONS
It was a pleasure taking care of you today and appreciate your seeing us at our [facility].     Today's plan is as follows:  1) Continue to wear medical compression stockings as prescribed  2) Elevate yours legs at rest  3) No LLE venous procedure to complete at this time, the patient has a great pulse and no varicose veins. She may follow along with primary care for future needs    Please call the office with any questions at [phone].   You can speak to our secretaries or our clinical nurses for specific questions.   For Vein Center specific questions, you can also call [phone] or email at [email or web address]  If you need coordinating your appointments and testing you can do these at the  or by calling my office shortly after your visit.

## 2024-08-01 ENCOUNTER — TELEPHONE (OUTPATIENT)
Dept: PRIMARY CARE | Facility: CLINIC | Age: 74
End: 2024-08-01
Payer: COMMERCIAL

## 2024-08-01 DIAGNOSIS — M79.671 PAIN IN BOTH FEET: ICD-10-CM

## 2024-08-01 DIAGNOSIS — G62.9 NEUROPATHY: Primary | ICD-10-CM

## 2024-08-01 DIAGNOSIS — M79.672 PAIN IN BOTH FEET: ICD-10-CM

## 2024-09-09 ENCOUNTER — APPOINTMENT (OUTPATIENT)
Dept: PRIMARY CARE | Facility: CLINIC | Age: 74
End: 2024-09-09
Payer: COMMERCIAL

## 2024-09-09 ENCOUNTER — LAB (OUTPATIENT)
Dept: LAB | Facility: LAB | Age: 74
End: 2024-09-09
Payer: COMMERCIAL

## 2024-09-09 VITALS
WEIGHT: 161.2 LBS | DIASTOLIC BLOOD PRESSURE: 88 MMHG | BODY MASS INDEX: 31.65 KG/M2 | SYSTOLIC BLOOD PRESSURE: 164 MMHG | OXYGEN SATURATION: 95 % | HEART RATE: 67 BPM | RESPIRATION RATE: 15 BRPM | TEMPERATURE: 96 F | HEIGHT: 60 IN

## 2024-09-09 DIAGNOSIS — I10 BENIGN ESSENTIAL HTN: ICD-10-CM

## 2024-09-09 DIAGNOSIS — Z78.0 ASYMPTOMATIC MENOPAUSE: ICD-10-CM

## 2024-09-09 DIAGNOSIS — K50.919 CROHN'S DISEASE WITH COMPLICATION, UNSPECIFIED GASTROINTESTINAL TRACT LOCATION (MULTI): ICD-10-CM

## 2024-09-09 DIAGNOSIS — Z00.00 ENCOUNTER FOR MEDICARE ANNUAL WELLNESS EXAM: ICD-10-CM

## 2024-09-09 DIAGNOSIS — E66.9 CLASS 1 OBESITY WITH SERIOUS COMORBIDITY AND BODY MASS INDEX (BMI) OF 31.0 TO 31.9 IN ADULT, UNSPECIFIED OBESITY TYPE: ICD-10-CM

## 2024-09-09 DIAGNOSIS — Z00.00 ENCOUNTER FOR MEDICARE ANNUAL WELLNESS EXAM: Primary | ICD-10-CM

## 2024-09-09 DIAGNOSIS — H04.129 EYE DRYNESS: ICD-10-CM

## 2024-09-09 DIAGNOSIS — Z12.31 ENCOUNTER FOR SCREENING MAMMOGRAM FOR BREAST CANCER: ICD-10-CM

## 2024-09-09 DIAGNOSIS — N18.31 CHRONIC KIDNEY DISEASE, STAGE 3A (MULTI): ICD-10-CM

## 2024-09-09 DIAGNOSIS — Z13.89 ENCOUNTER FOR SCREENING FOR OTHER DISORDER: ICD-10-CM

## 2024-09-09 PROBLEM — I73.9 PVD (PERIPHERAL VASCULAR DISEASE) WITH CLAUDICATION (CMS-HCC): Status: RESOLVED | Noted: 2023-03-16 | Resolved: 2024-09-09

## 2024-09-09 PROBLEM — I73.9 CLAUDICATION (CMS-HCC): Status: RESOLVED | Noted: 2023-03-16 | Resolved: 2024-09-09

## 2024-09-09 LAB
ALBUMIN SERPL BCP-MCNC: 4.2 G/DL (ref 3.4–5)
ALP SERPL-CCNC: 81 U/L (ref 33–136)
ALT SERPL W P-5'-P-CCNC: 20 U/L (ref 7–45)
ANION GAP SERPL CALC-SCNC: 11 MMOL/L (ref 10–20)
AST SERPL W P-5'-P-CCNC: 23 U/L (ref 9–39)
BASOPHILS # BLD AUTO: 0.03 X10*3/UL (ref 0–0.1)
BASOPHILS NFR BLD AUTO: 0.5 %
BILIRUB SERPL-MCNC: 1 MG/DL (ref 0–1.2)
BUN SERPL-MCNC: 17 MG/DL (ref 6–23)
CALCIUM SERPL-MCNC: 9.9 MG/DL (ref 8.6–10.6)
CHLORIDE SERPL-SCNC: 104 MMOL/L (ref 98–107)
CHOLEST SERPL-MCNC: 273 MG/DL (ref 0–199)
CHOLESTEROL/HDL RATIO: 2.3
CO2 SERPL-SCNC: 29 MMOL/L (ref 21–32)
CREAT SERPL-MCNC: 1.01 MG/DL (ref 0.5–1.05)
EGFRCR SERPLBLD CKD-EPI 2021: 59 ML/MIN/1.73M*2
EOSINOPHIL # BLD AUTO: 0.1 X10*3/UL (ref 0–0.4)
EOSINOPHIL NFR BLD AUTO: 1.6 %
ERYTHROCYTE [DISTWIDTH] IN BLOOD BY AUTOMATED COUNT: 13.4 % (ref 11.5–14.5)
GLUCOSE SERPL-MCNC: 97 MG/DL (ref 74–99)
HCT VFR BLD AUTO: 46.4 % (ref 36–46)
HDLC SERPL-MCNC: 118.5 MG/DL
HGB BLD-MCNC: 15.4 G/DL (ref 12–16)
IMM GRANULOCYTES # BLD AUTO: 0.02 X10*3/UL (ref 0–0.5)
IMM GRANULOCYTES NFR BLD AUTO: 0.3 % (ref 0–0.9)
LDLC SERPL CALC-MCNC: 140 MG/DL
LYMPHOCYTES # BLD AUTO: 2.17 X10*3/UL (ref 0.8–3)
LYMPHOCYTES NFR BLD AUTO: 34.2 %
MCH RBC QN AUTO: 31.3 PG (ref 26–34)
MCHC RBC AUTO-ENTMCNC: 33.2 G/DL (ref 32–36)
MCV RBC AUTO: 94 FL (ref 80–100)
MONOCYTES # BLD AUTO: 0.72 X10*3/UL (ref 0.05–0.8)
MONOCYTES NFR BLD AUTO: 11.3 %
NEUTROPHILS # BLD AUTO: 3.31 X10*3/UL (ref 1.6–5.5)
NEUTROPHILS NFR BLD AUTO: 52.1 %
NON HDL CHOLESTEROL: 155 MG/DL (ref 0–149)
NRBC BLD-RTO: 0 /100 WBCS (ref 0–0)
PLATELET # BLD AUTO: 291 X10*3/UL (ref 150–450)
POTASSIUM SERPL-SCNC: 4.3 MMOL/L (ref 3.5–5.3)
PROT SERPL-MCNC: 7.8 G/DL (ref 6.4–8.2)
RBC # BLD AUTO: 4.92 X10*6/UL (ref 4–5.2)
SODIUM SERPL-SCNC: 140 MMOL/L (ref 136–145)
T4 FREE SERPL-MCNC: 1.16 NG/DL (ref 0.78–1.48)
TRIGL SERPL-MCNC: 75 MG/DL (ref 0–149)
TSH SERPL-ACNC: 4.53 MIU/L (ref 0.44–3.98)
VLDL: 15 MG/DL (ref 0–40)
WBC # BLD AUTO: 6.4 X10*3/UL (ref 4.4–11.3)

## 2024-09-09 PROCEDURE — 1170F FXNL STATUS ASSESSED: CPT | Performed by: FAMILY MEDICINE

## 2024-09-09 PROCEDURE — 3077F SYST BP >= 140 MM HG: CPT | Performed by: FAMILY MEDICINE

## 2024-09-09 PROCEDURE — 84443 ASSAY THYROID STIM HORMONE: CPT

## 2024-09-09 PROCEDURE — 80061 LIPID PANEL: CPT

## 2024-09-09 PROCEDURE — 84439 ASSAY OF FREE THYROXINE: CPT

## 2024-09-09 PROCEDURE — 1158F ADVNC CARE PLAN TLK DOCD: CPT | Performed by: FAMILY MEDICINE

## 2024-09-09 PROCEDURE — 1123F ACP DISCUSS/DSCN MKR DOCD: CPT | Performed by: FAMILY MEDICINE

## 2024-09-09 PROCEDURE — 36415 COLL VENOUS BLD VENIPUNCTURE: CPT

## 2024-09-09 PROCEDURE — 1036F TOBACCO NON-USER: CPT | Performed by: FAMILY MEDICINE

## 2024-09-09 PROCEDURE — 80053 COMPREHEN METABOLIC PANEL: CPT

## 2024-09-09 PROCEDURE — 85025 COMPLETE CBC W/AUTO DIFF WBC: CPT

## 2024-09-09 PROCEDURE — 1160F RVW MEDS BY RX/DR IN RCRD: CPT | Performed by: FAMILY MEDICINE

## 2024-09-09 PROCEDURE — 99214 OFFICE O/P EST MOD 30 MIN: CPT | Performed by: FAMILY MEDICINE

## 2024-09-09 PROCEDURE — 1159F MED LIST DOCD IN RCRD: CPT | Performed by: FAMILY MEDICINE

## 2024-09-09 PROCEDURE — G0439 PPPS, SUBSEQ VISIT: HCPCS | Performed by: FAMILY MEDICINE

## 2024-09-09 PROCEDURE — 99397 PER PM REEVAL EST PAT 65+ YR: CPT | Performed by: FAMILY MEDICINE

## 2024-09-09 PROCEDURE — 3008F BODY MASS INDEX DOCD: CPT | Performed by: FAMILY MEDICINE

## 2024-09-09 PROCEDURE — 3079F DIAST BP 80-89 MM HG: CPT | Performed by: FAMILY MEDICINE

## 2024-09-09 RX ORDER — AMLODIPINE BESYLATE 5 MG/1
10 TABLET ORAL DAILY
Qty: 90 TABLET | Refills: 1 | Status: SHIPPED | OUTPATIENT
Start: 2024-09-09 | End: 2025-09-04

## 2024-09-09 ASSESSMENT — ACTIVITIES OF DAILY LIVING (ADL)
DOING_HOUSEWORK: INDEPENDENT
BATHING: INDEPENDENT
GROCERY_SHOPPING: INDEPENDENT
TAKING_MEDICATION: INDEPENDENT
DRESSING: INDEPENDENT
MANAGING_FINANCES: INDEPENDENT

## 2024-09-09 NOTE — PROGRESS NOTES
Subjective   Reason for Visit: Katerina Joyner is an 74 y.o. female here for a Medicare Wellness visit.     Past Medical, Surgical, and Family History reviewed and updated in chart.    Reviewed all medications by prescribing practitioner or clinical pharmacist (such as prescriptions, OTCs, herbal therapies and supplements) and documented in the medical record.    \[facility]\""    Patient Care Team:  Starr Gray DO as PCP - General  LEANDRO Lane-CNP as PCP - [employer] PCP     Health Maintenance Due   Topic Date Due    Bone Density Scan  Never done    TB Test  Never done    Hepatitis B Surface Antibody  Never done    Hepatitis A Vaccines (1 of 2 - Risk 2-dose series) Never done    Zoster Vaccines (1 of 2) Never done    RSV Pregnant patients and/or  patients aged 60+ years (1 - 1-dose 60+ series) Never done    Vitamin B-12  08/02/2020    Vitamin D25-OH  05/18/2021    CKD: Urine Protein Screening  06/06/2024    Mammogram  06/08/2024    Influenza Vaccine (1) Never done    COVID-19 Vaccine (4 - 2023-24 season) 09/01/2024    Medicare Annual Wellness Visit (AWV)  09/23/2024     HTN  BP NOT at goal today in office.  Using medications without issues.  Denies CP, SOB, palpitations, change in vision, dizziness, N/V.    Has been with water eyes  No redness and no pain    Would like a letter for jury duty for her leg pain  seen by vascular team- r/o vascular causes    Review of Systems  All systems reviewed and neg if not noted in the HPI above       Objective   Vitals:  BP (!) 178/92 (Patient Position: Sitting)   Pulse 67   Temp 35.6 °C (96 °F)   Resp 15   Ht 1.524 m (5')   Wt 73.1 kg (161 lb 3.2 oz)   SpO2 95%   BMI 31.48 kg/m²       Physical Exam    Pleasant  Eyes: conjunctiva non-icteric and eye lids are without obvious rash or drooping. Pupils are symmetric.   Ears, Nose, Mouth, and Throat: External ears and nose appear to be without deformity or rash. No lesions or masses noted. Hearing is grossly  intact.   CV: RRR, no murmur  Carotids: no bruits  Thyroid nml  Pulm:CTA B/L  Abd: soft, NTTP, + BS  LE: no edema  Psychiatric: Alert, orientation to person, place, and time. Recent/remote memory as evidenced through face-to-face interaction and discussion appear grossly intact. Mood and affect are normal.      Assessment & Plan  Encounter for Medicare annual wellness exam    Orders:    Comprehensive Metabolic Panel; Future    CBC and Auto Differential; Future    Lipid Panel; Future    TSH with reflex to Free T4 if abnormal; Future    Crohn's disease with complication, unspecified gastrointestinal tract location (Multi)  Doing well  No flairs  Continue to monitor your diet         Chronic kidney disease, stage 3a (Multi)  Last levels were nml  Rechecking labs today  BP control warranted    Orders:    Comprehensive Metabolic Panel; Future    Albumin-Creatinine Ratio, Urine Random; Future    Encounter for screening for other disorder         Benign essential HTN    - Your blood pressure is NOT at goal today- goal is less than 130/80  -inc amlodipine for now, may need losartan increased as well  - cut back on alcohol  - Weight loss can help lower your bp!  Work on a healthy whole food diet and add at least 30min of exercise 5 days per week  - Work on a low salt diet  Orders:    Comprehensive Metabolic Panel; Future    CBC and Auto Differential; Future    Lipid Panel; Future    TSH with reflex to Free T4 if abnormal; Future    amLODIPine (Norvasc) 5 mg tablet; Take 2 tablets (10 mg) by mouth once daily.    Encounter for screening mammogram for breast cancer    Orders:    BI mammo bilateral screening tomosynthesis; Future    Asymptomatic menopause    Orders:    XR DEXA bone density; Future    Eye dryness    Orders:    dextran 70-hypromellose (Bion Tears) 0.1-0.3 % ophthalmic solution; Administer 1 drop into both eyes 4 times a day as needed for dry eyes.    Class 1 obesity with serious comorbidity and body mass index  (BMI) of 31.0 to 31.9 in adult, unspecified obesity type                Cardiac Risk Assessment  15 - 20 minutes were spent discussing Cardiovascular risk and, if needed, lifestyle modifications were recommended, including nutritional choices, exercise, and elimination of habits contributing to risk.   Aspirin use/disuse was discussed following the guidelines below:  low dose ASA ( mg) should be considered:    If prior Heart Attack/Stroke/Peripheral vascular disease:  Generally recommend daily low dose aspirin unless extremely high bleeding risk (e.g., gastrointestinal).    If no prior Heart Attack/Stroke/Peripheral vascular disease:              Age over 70: Do not use Aspirin for prevention    Age less than 70 and 10-year cardiovascular disease risk is >20%: use low dose Aspirin for prevention.                 Obesity Counseling  15 - 20 minutes were spent counseling on diet and exercise interventions to address obesity and weight reduction.       Please follow up in:  - 1 yr for medcare wellness  - 6months for HTN  - 2wks for BP check nurse clinic   or as needed.

## 2024-09-09 NOTE — LETTER
Katerina Joyner  1950 9/9/2024    To Whom This May Concern:    My patient, Katerina Joyner, is unable to perform Jury Duty due to a mental or physical condition that renders the prospective juror unfit for jury service for the remainder of the jury year.    Should you have any questions please do not hesitate to call.    Thank you for your cooperation.    Sincerely,    Starr Gray, DO

## 2024-09-09 NOTE — ASSESSMENT & PLAN NOTE
Last levels were nml  Rechecking labs today  BP control warranted    Orders:    Comprehensive Metabolic Panel; Future    Albumin-Creatinine Ratio, Urine Random; Future

## 2024-09-09 NOTE — ASSESSMENT & PLAN NOTE
- Your blood pressure is NOT at goal today- goal is less than 130/80  -inc amlodipine for now, may need losartan increased as well  - cut back on alcohol  - Weight loss can help lower your bp!  Work on a healthy whole food diet and add at least 30min of exercise 5 days per week  - Work on a low salt diet  Orders:    Comprehensive Metabolic Panel; Future    CBC and Auto Differential; Future    Lipid Panel; Future    TSH with reflex to Free T4 if abnormal; Future    amLODIPine (Norvasc) 5 mg tablet; Take 2 tablets (10 mg) by mouth once daily.

## 2024-09-09 NOTE — PATIENT INSTRUCTIONS
Shingrix, COVID and RSV- can get from your local pharm    Labs today    Letter william    HTN  - Your blood pressure is NOT at goal today- goal is less than 130/80  -inc amlodipine for now, may need losartan increased as well  - cut back on alcohol  - Weight loss can help lower your bp!  Work on a healthy whole food diet and add at least 30min of exercise 5 days per week  - Work on a low salt diet    Mamm and dexa- can call for same day appt    Eye drops for the eyes  Let me know if not better for eye appt      Please follow up in:  - 1 yr for medcare wellness  - 6months for HTN  - 2wks for BP check nurse clinic   or as needed.       ** If labs or imaging ordered at today's visit, all the non-urgent results will be discussed at your next visit    If you have been referred for a special test or to a specialist please call  [phone] to schedule an appointment.  If you have any further questions, or if develop new or worsened symptoms, please give our office a call at [phone].

## 2024-09-11 ENCOUNTER — TELEPHONE (OUTPATIENT)
Dept: PHARMACY | Facility: HOSPITAL | Age: 74
End: 2024-09-11
Payer: COMMERCIAL

## 2024-09-11 NOTE — TELEPHONE ENCOUNTER
I reviewed the telephone encounter and agree with the student’s findings and plans as written. Case discussed with student.    Aster Tijerina, PharmD

## 2024-09-11 NOTE — TELEPHONE ENCOUNTER
Population Health: Outreach by Ambulatory Pharmacy Team    Payor: [employer] [place]  Reason: Adherence  Medication: Losartan  Outcome: Patient Reached: Will Refill, will call today.    EDNA ADAN

## 2024-09-24 ENCOUNTER — APPOINTMENT (OUTPATIENT)
Dept: PRIMARY CARE | Facility: CLINIC | Age: 74
End: 2024-09-24
Payer: COMMERCIAL

## 2024-09-30 ENCOUNTER — APPOINTMENT (OUTPATIENT)
Dept: PRIMARY CARE | Facility: CLINIC | Age: 74
End: 2024-09-30
Payer: COMMERCIAL

## 2024-10-31 DIAGNOSIS — I10 BENIGN ESSENTIAL HTN: ICD-10-CM

## 2024-11-01 ENCOUNTER — PATIENT OUTREACH (OUTPATIENT)
Dept: PRIMARY CARE | Facility: CLINIC | Age: 74
End: 2024-11-01
Payer: COMMERCIAL

## 2024-11-04 ENCOUNTER — PATIENT OUTREACH (OUTPATIENT)
Dept: PRIMARY CARE | Facility: CLINIC | Age: 74
End: 2024-11-04
Payer: COMMERCIAL

## 2024-11-04 RX ORDER — LOSARTAN POTASSIUM 50 MG/1
50 TABLET ORAL DAILY
Qty: 90 TABLET | Refills: 0 | Status: SHIPPED | OUTPATIENT
Start: 2024-11-04 | End: 2025-10-30

## 2024-11-05 ENCOUNTER — OFFICE VISIT (OUTPATIENT)
Dept: URGENT CARE | Age: 74
End: 2024-11-05
Payer: COMMERCIAL

## 2024-11-05 ENCOUNTER — TELEPHONE (OUTPATIENT)
Dept: URGENT CARE | Age: 74
End: 2024-11-05

## 2024-11-05 ENCOUNTER — HOSPITAL ENCOUNTER (OUTPATIENT)
Dept: RADIOLOGY | Facility: CLINIC | Age: 74
Discharge: HOME | End: 2024-11-05
Payer: COMMERCIAL

## 2024-11-05 VITALS
OXYGEN SATURATION: 95 % | SYSTOLIC BLOOD PRESSURE: 172 MMHG | DIASTOLIC BLOOD PRESSURE: 91 MMHG | RESPIRATION RATE: 16 BRPM | TEMPERATURE: 98.1 F

## 2024-11-05 DIAGNOSIS — N18.31 CHRONIC KIDNEY DISEASE, STAGE 3A (MULTI): ICD-10-CM

## 2024-11-05 DIAGNOSIS — S39.92XA LOWER BACK INJURY, INITIAL ENCOUNTER: Primary | ICD-10-CM

## 2024-11-05 DIAGNOSIS — S39.92XA LOWER BACK INJURY, INITIAL ENCOUNTER: ICD-10-CM

## 2024-11-05 DIAGNOSIS — I10 BENIGN ESSENTIAL HTN: ICD-10-CM

## 2024-11-05 DIAGNOSIS — S32.010A COMPRESSION FRACTURE OF L1 VERTEBRA, INITIAL ENCOUNTER (MULTI): ICD-10-CM

## 2024-11-05 DIAGNOSIS — S32.010A COMPRESSION FRACTURE OF L1 VERTEBRA, INITIAL ENCOUNTER (MULTI): Primary | ICD-10-CM

## 2024-11-05 PROCEDURE — 72110 X-RAY EXAM L-2 SPINE 4/>VWS: CPT | Performed by: RADIOLOGY

## 2024-11-05 PROCEDURE — 72110 X-RAY EXAM L-2 SPINE 4/>VWS: CPT

## 2024-11-05 RX ORDER — PREDNISONE 20 MG/1
40 TABLET ORAL DAILY
Qty: 10 TABLET | Refills: 0 | Status: SHIPPED | OUTPATIENT
Start: 2024-11-05 | End: 2024-11-10

## 2024-11-05 ASSESSMENT — ENCOUNTER SYMPTOMS: BACK PAIN: 1

## 2024-11-05 NOTE — TELEPHONE ENCOUNTER
Please call patient.  They need a follow up appointment BP check due to uncontrolled HTN - BP was high at her last appt with   - please schedule with nurse clinic      Thanks!    Dr. Gray

## 2024-11-05 NOTE — PROGRESS NOTES
Subjective   Patient ID: Katerina Joyner is a 74 y.o. female. They present today with a chief complaint of Back Pain (X yesterday /Pain 10/10 ).    History of Present Illness    Back Pain        74-year-old female complaining of low back injury.  Patient states she is not filing this is a work injury.  She tried to  an adult person and she felt a painful crack on her lower back.  Since then it has progressively gotten worse.  She has a hard time walking.  She denies urinary or bowel incontinence.  She did not fall.    Extensive past medical history which includes hypertension, CKD, Crohn's disease.  Patient's blood pressure is elevated here, states she has not taken her BP meds yet.  Crohn's disease has been stable for the last couple of years.  She denies diabetes.  She has received steroids in the past without untoward side effects.      Past Medical History  Allergies as of 11/05/2024 - Reviewed 09/09/2024   Allergen Reaction Noted    Codeine Unknown 11/03/2003       (Not in a hospital admission)       Past Medical History:   Diagnosis Date    Chronic kidney disease, stage 3a (Multi) 07/15/2021    Chronic kidney disease, stage 3a    Other obesity due to excess calories 05/13/2021    Class 1 obesity due to excess calories with serious comorbidity and body mass index (BMI) of 33.0 to 33.9 in adult    Other specified disorders of nose and nasal sinuses 02/19/2021    Nasal sore    Other specified soft tissue disorders 10/04/2019    Swelling of left lower extremity    Patient's noncompliance with other medical treatment and regimen due to unspecified reason 04/17/2020    Failure to attend appointment    Personal history of other diseases of the circulatory system     History of hypertension    Personal history of other diseases of the respiratory system 02/19/2021    History of paranasal sinus congestion    Personal history of other specified conditions 05/13/2021    History of dizziness    Personal history of  other specified conditions 2019    History of chronic fatigue       Past Surgical History:   Procedure Laterality Date    OTHER SURGICAL HISTORY  2019    Hysterectomy    OTHER SURGICAL HISTORY  2019     section    OTHER SURGICAL HISTORY  2019    Intestinal surgery        reports that she has never smoked. She has never used smokeless tobacco. She reports that she does not currently use alcohol. She reports that she does not use drugs.    Review of Systems  Review of Systems   Musculoskeletal:  Positive for back pain.                                  Objective    Vitals:    24 1320   BP: (!) 172/91   BP Location: Left arm   Patient Position: Sitting   Resp: 16   Temp: 36.7 °C (98.1 °F)   SpO2: 95%     No LMP recorded.    Physical Exam    Constitutional: Vital signs reviewed. Patient alert and without distress.  In pain.     Cardiovascular: Pedal pulses normal. No peripheral edema.    Skin: No lacerations, abrasions, bruises or lesions noted of the trunk.     Neurologic: Cortical function: Normal. Sensation: Normal. No saddle anesthesia.  Motor: Normal. Coordination: Able to stand and walk independently but slow and in pain.     Musculoskeletal:    Thoracic spine non tender.  Ribs non tender, no paradoxical movement.     Lumbar spine tender. Lumbar paraspinal muscles intact. Pelvic bones intact. Normal hip ROM. Negative straight leg raise test.  Femur, quadriceps and hamstrings intact.          Procedures    Point of Care Test & Imaging Results from this visit  No results found for this visit on 24.   XR lumbar spine complete 4+ views    Result Date: 2024  Interpreted By:  Faheem Bowser, STUDY: XR LUMBAR SPINE COMPLETE 4+ VIEWS; ;  2024 3:21 pm   INDICATION: Signs/Symptoms:felt a painful pop in the lumbar area after picking up a person.   ,S39.92XA Unspecified injury of lower back, initial encounter   COMPARISON: None.   ACCESSION NUMBER(S): [id number]    "ORDERING CLINICIAN: NESSA GARCIA   FINDINGS: Mild compression fracture L1 vertebral body, age indeterminate. Grade 1 anterolisthesis L4-L5. Multilevel small marginal osteophytes. No vertebral body or disc height loss. Facet arthropathy, greatest at L5-S1.       Mild compression fracture L1 vertebral body, age indeterminate. Correlate with focal tenderness and consider follow-up with MRI as warranted.     MACRO: None   Signed by: Faheem Bowser 11/5/2024 5:09 PM Dictation workstation:   [id number]     Diagnostic study results (if any) were reviewed by Nessa Garcia.  Prelim: Osteopenia, degenerative changes, no definite fracture or dislocation noted.    Assessment/Plan   Allergies, medications, history, and pertinent labs/EKGs/Imaging reviewed by Nessa Garcia.     Medical Decision Making  Addendum made. Left vm and sent message in Epic: \"You have a compression fracture of the upper lumbar spine.  Radiologist is unsure if new or not, but considering the mechanism of injury, I suspect this is new.  Please DO NOT take the prednisone but you may follow the rest of instructions in the discharge papers given to you.  If you have questions, feel free to call the urgent care at [phone]. I also left you a voicemail with the same message as above\".     Orders and Diagnoses  Diagnoses and all orders for this visit:  Lower back injury, initial encounter  -     XR lumbar spine complete 4+ views; Future  -     predniSONE (Deltasone) 20 mg tablet; Take 2 tablets (40 mg) by mouth once daily for 5 days.  Chronic kidney disease, stage 3a (Multi)  Benign essential HTN  Compression fracture of L1 vertebra, initial encounter (Multi)      Medical Admin Record      Patient disposition: Home    Electronically signed by Nessa Garcia  8:15 PM      "

## 2024-11-05 NOTE — LETTER
November 5, 2024     Patient: Katerina Joyner   YOB: 1950   Date of Visit: 11/5/2024       To Whom It May Concern:    Katerina Joyner was seen in my clinic on 11/5/2024 at 12:30 pm. Please excuse Katerina for her absence from work on this day to make the appointment. Please excuse Katerina for today, tomorrow 11/06/2024, and Saturday 11/09/2024. She may return to work on Humberto 11/10/2024.    If you have any questions or concerns, please don't hesitate to call.         Sincerely,   LUCHO Morales        CC: No Recipients

## 2024-11-05 NOTE — PATIENT INSTRUCTIONS
Go to the emergency department for severe symptoms.      Follow-up with primary care in 7 to 14 days especially if symptoms are not improving.    Prednisone prescribed.  However, wait for our call to confirm your x-ray reading before starting.  If there is a fracture, we will not recommend taking the prednisone.    Acetaminophen Arthritis 650 mg (Tylenol Arthritis) 2 tablets twice a day as needed for pain. No additional Acetaminophen on the side.  At least 8 hours apart.    No Ibuprofen (Advil or Motrin) or Naproxen (Aleve); no decongestants or cold/sinus medicines as these can raise your blood pressure.  Please take your blood pressure medicine as soon as possible.    Ice, wrapped in a towel, 20 minutes on/20 minutes off while awake within the first 48 hours of the injury afterwards, OK to apply heat on/off as needed for comfort. Elevate the affected area on/off as needed for comfort.    Keep the bag moving to prevent stiffening but activity as tolerated.

## 2024-11-06 ENCOUNTER — TELEPHONE (OUTPATIENT)
Dept: URGENT CARE | Age: 74
End: 2024-11-06

## 2024-11-06 NOTE — TELEPHONE ENCOUNTER
Result Communication    No results found from the In Basket message.    2:24 PM      Results were successfully communicated with the patient and they acknowledged their understanding.

## 2024-11-07 ENCOUNTER — TELEPHONE (OUTPATIENT)
Dept: PRIMARY CARE | Facility: CLINIC | Age: 74
End: 2024-11-07
Payer: COMMERCIAL

## 2024-11-07 NOTE — TELEPHONE ENCOUNTER
Patient was advised at her urgent care visit to FU with pcp about xray finding's on her back. Patient was advised also by Urgent care to use OTC medication but the medication isn't effective due to the amount of pain she's in and the finding's of the xray. Patient has successfully scheduled an appt with her pcp to discuss this matter further.

## 2024-11-08 ENCOUNTER — OFFICE VISIT (OUTPATIENT)
Dept: PRIMARY CARE | Facility: CLINIC | Age: 74
End: 2024-11-08
Payer: COMMERCIAL

## 2024-11-08 ENCOUNTER — HOSPITAL ENCOUNTER (OUTPATIENT)
Dept: RADIOLOGY | Facility: CLINIC | Age: 74
Discharge: HOME | End: 2024-11-08
Payer: COMMERCIAL

## 2024-11-08 VITALS
HEIGHT: 61 IN | RESPIRATION RATE: 16 BRPM | HEART RATE: 84 BPM | TEMPERATURE: 97 F | DIASTOLIC BLOOD PRESSURE: 90 MMHG | OXYGEN SATURATION: 97 % | WEIGHT: 156 LBS | BODY MASS INDEX: 29.45 KG/M2 | SYSTOLIC BLOOD PRESSURE: 180 MMHG

## 2024-11-08 DIAGNOSIS — M89.8X8 BONY PELVIC PAIN: ICD-10-CM

## 2024-11-08 DIAGNOSIS — S32.010S COMPRESSION FRACTURE OF L1 LUMBAR VERTEBRA, SEQUELA: ICD-10-CM

## 2024-11-08 DIAGNOSIS — S32.010S COMPRESSION FRACTURE OF L1 LUMBAR VERTEBRA, SEQUELA: Primary | ICD-10-CM

## 2024-11-08 PROCEDURE — G2211 COMPLEX E/M VISIT ADD ON: HCPCS | Performed by: FAMILY MEDICINE

## 2024-11-08 PROCEDURE — 1160F RVW MEDS BY RX/DR IN RCRD: CPT | Performed by: FAMILY MEDICINE

## 2024-11-08 PROCEDURE — 1123F ACP DISCUSS/DSCN MKR DOCD: CPT | Performed by: FAMILY MEDICINE

## 2024-11-08 PROCEDURE — 99214 OFFICE O/P EST MOD 30 MIN: CPT | Performed by: FAMILY MEDICINE

## 2024-11-08 PROCEDURE — 3008F BODY MASS INDEX DOCD: CPT | Performed by: FAMILY MEDICINE

## 2024-11-08 PROCEDURE — 3080F DIAST BP >= 90 MM HG: CPT | Performed by: FAMILY MEDICINE

## 2024-11-08 PROCEDURE — 1159F MED LIST DOCD IN RCRD: CPT | Performed by: FAMILY MEDICINE

## 2024-11-08 PROCEDURE — 3077F SYST BP >= 140 MM HG: CPT | Performed by: FAMILY MEDICINE

## 2024-11-08 PROCEDURE — 72170 X-RAY EXAM OF PELVIS: CPT

## 2024-11-08 RX ORDER — OXYCODONE AND ACETAMINOPHEN 5; 325 MG/1; MG/1
1 TABLET ORAL EVERY 6 HOURS PRN
Qty: 28 TABLET | Refills: 0 | Status: SHIPPED | OUTPATIENT
Start: 2024-11-08 | End: 2024-11-15

## 2024-11-08 NOTE — PATIENT INSTRUCTIONS
Upcoming appt with dexa      Back fracture  - percocet  as needed X 7days  - referral for ortho spine  - consider PT after ortho appt  Topical creams- Salonpas, Icy hot, Bio freeze, Capsaicin, Asper cream, or Tiger balm (these are all over the counter)  - heat/ice to the area      Pelvic pain  - xray today      Please follow up as scheduled in MARCH and soon if not better or as needed.       ** If labs or imaging ordered at today's visit, all the non-urgent results will be discussed at your next visit    If you have been referred for a special test or to a specialist please call  [phone] to schedule an appointment.  If you have any further questions, or if develop new or worsened symptoms, please give our office a call at [phone].

## 2024-11-08 NOTE — PROGRESS NOTES
"Subjective   Patient ID: Katerina Joyner is a 74 y.o. female who presents for Follow-up ([facility] ER visit. Hairline back fx).    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 Grade  1 anterolisthesis L4-L5. Multilevel small marginal osteophytes. No  vertebral body or disc height loss. Facet arthropathy, greatest at  L5-S1.      IMPRESSION:  Mild compression fracture L1 vertebral body, age indeterminate.  Correlate with focal tenderness and consider follow-up with MRI as  warranted.        Review of Systems  All systems reviewed and neg if not noted in the HPI above     Objective   /90 (Patient Position: Sitting)   Pulse 84   Temp 36.1 °C (97 °F)   Resp 16   Ht 1.549 m (5' 1\")   Wt 70.8 kg (156 lb)   SpO2 97%   BMI 29.48 kg/m²     Physical Exam  Constitutional:       Appearance: Normal appearance.   HENT:      Head: Normocephalic.   Eyes:      Extraocular Movements: Extraocular movements intact.      Conjunctiva/sclera: Conjunctivae normal.      Pupils: Pupils are equal, round, and reactive to light.   Pulmonary:      Effort: Pulmonary effort is normal.   Musculoskeletal:         General: No deformity or signs of injury.      Lumbar back: Tenderness and bony tenderness present. No swelling or deformity. Decreased range of motion. Negative right straight leg raise test and negative left straight leg raise test.      Comments: Tender to palpation at the pelvic region   Skin:     Coloration: Skin is not jaundiced.      Findings: No rash.   Neurological:      Mental Status: She is alert and oriented to person, place, and time. Mental status is at baseline. "   Psychiatric:         Mood and Affect: Mood normal.         Assessment/Plan   Problem List Items Addressed This Visit    None  Visit Diagnoses         Codes    Compression fracture of L1 lumbar vertebra, sequela    -  Primary    - sent over percocet  as needed X 7days  - referral for ortho spine  - consider PT after ortho appt  Topical creams- Salonpas, Icy hot, Bio freeze, Capsaicin, Asper cream, or Tiger balm (these are all over the counter)  - heat/ice to the area  - off of work for at least 1 wk       S32.010S    Relevant Orders    Referral to Orthopaedic Surgery    Bony pelvic pain     M89.8X8    Relevant Orders    Referral to Orthopaedic Surgery                 Please follow up as scheduled in MARCH and soon if not better or as needed.

## 2024-11-08 NOTE — LETTER
November 8, 2024     Patient: Katerina Joyner   YOB: 1950   Date of Visit: 11/8/2024       To Whom It May Concern:    It is my medical opinion that Katerina Joyner may return to work on 11/16/24 .    If you have any questions or concerns, please don't hesitate to call.         Sincerely,        Starr Gray, DO

## 2024-11-13 ENCOUNTER — DOCUMENTATION (OUTPATIENT)
Dept: PRIMARY CARE | Facility: CLINIC | Age: 74
End: 2024-11-13
Payer: COMMERCIAL

## 2024-11-14 ENCOUNTER — TELEPHONE (OUTPATIENT)
Dept: PRIMARY CARE | Facility: CLINIC | Age: 74
End: 2024-11-14
Payer: COMMERCIAL

## 2024-11-14 DIAGNOSIS — S32.010S COMPRESSION FRACTURE OF L1 LUMBAR VERTEBRA, SEQUELA: Primary | ICD-10-CM

## 2024-11-14 NOTE — LETTER
Date: 2024  RE:  Katerina Joyner  :  1950      To Whom It May Concern:    Our patient, Katerina, has been under our care and now may return back to work without restrictions.    Their return to work date is:  24    If you have questions concerning this patient's immediate care, please feel free to contact our office at [phone].    Sincerely,      Starr Gray, DO

## 2024-11-14 NOTE — TELEPHONE ENCOUNTER
Patient stated she is still feeling bad and thinks she needs more days off of work. Made her appt for ortho doctor. Said she thinks she'll need to see worker comp doctor for job because she thinks her work is the cause of her problems. Also would like to know if she and get a DEYSI for work extended. Wants to speak with doctor.     Please Advise, Thank you

## 2024-11-15 NOTE — TELEPHONE ENCOUNTER
Okay to extend time off for     1.Placed referral for occupational medicine doctor for help with Workmen's Comp. or she can check with her job to see if they have at this    2.  Referral placed for pain management-for help with pain    3.  See if she can call to get a sooner appointment with orthopedic.  Can check with a different facility for sooner availability

## 2024-11-18 NOTE — TELEPHONE ENCOUNTER
Patient asks for a letter for work to state no restrictions for work  Would like to pick it up tonight or tomorrow morning  TY

## 2024-11-18 NOTE — TELEPHONE ENCOUNTER
"Pt states back pain is better and ready to return to work  Letter was already written but job would like \"no restrictions\" placed on the letter      Letter written and ready for           "

## 2024-11-29 ENCOUNTER — APPOINTMENT (OUTPATIENT)
Dept: RADIOLOGY | Facility: CLINIC | Age: 74
End: 2024-11-29
Payer: COMMERCIAL

## 2024-12-03 ENCOUNTER — OFFICE VISIT (OUTPATIENT)
Dept: ORTHOPEDIC SURGERY | Facility: CLINIC | Age: 74
End: 2024-12-03
Payer: COMMERCIAL

## 2024-12-03 VITALS — BODY MASS INDEX: 32.25 KG/M2 | WEIGHT: 160 LBS | HEIGHT: 59 IN

## 2024-12-03 DIAGNOSIS — M89.8X8 BONY PELVIC PAIN: ICD-10-CM

## 2024-12-03 DIAGNOSIS — S32.010S COMPRESSION FRACTURE OF L1 LUMBAR VERTEBRA, SEQUELA: ICD-10-CM

## 2024-12-03 DIAGNOSIS — M47.816 LUMBAR SPONDYLOSIS: ICD-10-CM

## 2024-12-03 DIAGNOSIS — M54.16 LUMBAR RADICULOPATHY: Primary | ICD-10-CM

## 2024-12-03 DIAGNOSIS — M43.16 SPONDYLOLISTHESIS OF LUMBAR REGION: ICD-10-CM

## 2024-12-03 PROCEDURE — 1159F MED LIST DOCD IN RCRD: CPT

## 2024-12-03 PROCEDURE — 3008F BODY MASS INDEX DOCD: CPT

## 2024-12-03 PROCEDURE — G2211 COMPLEX E/M VISIT ADD ON: HCPCS

## 2024-12-03 PROCEDURE — 99214 OFFICE O/P EST MOD 30 MIN: CPT

## 2024-12-03 PROCEDURE — 99204 OFFICE O/P NEW MOD 45 MIN: CPT

## 2024-12-03 PROCEDURE — 1123F ACP DISCUSS/DSCN MKR DOCD: CPT

## 2024-12-03 RX ORDER — GABAPENTIN 300 MG/1
300 CAPSULE ORAL 2 TIMES DAILY
Qty: 60 CAPSULE | Refills: 2 | Status: SHIPPED | OUTPATIENT
Start: 2024-12-03 | End: 2025-03-03

## 2024-12-03 NOTE — PROGRESS NOTES
HPI:  Katerina Joyner is a 74-year-old female who presents today for follow-up appointment regarding lumbar compression fracture of L1.  This happened around 8 November.  She was bending over to  a client and felt a crack in her back.  She immediately had back pain.  Currently, her pain comes and goes.  She initially was taking oxycodone, Tylenol, and is not using heat.  She has difficulty going from a seated to standing position.  She has mild pain when ambulating.  Of note, she does have some left anterior leg pain that travels into the shin.  This is going on before her recent lumbar compression fracture.  She denies symptoms in the right leg.  No other questions or concerns to me visit.    ROS:  Reviewed on EMR and patient intake sheet.    PMH/SH:  Reviewed on EMR and patient intake sheet.    Exam:  MSK: Full strength range of motion of right lower extremity, 4/5 strength of left lower extremity.  Negative straight leg raise bilaterally.  General: No acute distress. Awake and conversant.  Eyes: Normal conjunctiva, anicteric. Round symmetric pupils.  ENT: Hearing grossly intact. No nasal discharge.  Neck: Neck is supple. No masses or thyromegaly.  Respiratory: Respirations are non-labored. No wheezing.  Skin: Warm. No rashes or ulcers.  Psych: Alert and oriented. Cooperative, appropriate mood and affect, normal judgement.  CV: No lower extremity edema.  Neuro: Sensation and CN II-XII grossly normal.    Radiology:     X-rays of lumbar spine personally viewed and demonstrate superior endplate fracture of L1.  Grade 1 anterolisthesis of L4 and L5.  Mild spondylosis throughout.    Diagnosis:    Lumbar compression fracture, L1  Lumbar spondylolisthesis  Lumbar radiculopathy  Lumbar spondylosis    Assessment and Plan:  Patient was seen today evaluated for follow-up appointment regarding lumbar compression fracture.  At this time, we discussed conservative management of ambulating etc., limiting bending, twisting,  lifting, over-the-counter pain medicines such as Tylenol and ibuprofen, and use of heat/ice as needed.  Also, we discussed her lumbar radicular symptoms and lumbar spondylolisthesis.  I prescribed gabapentin for 300mg twice daily.  I placed an order for physical therapy, but she should wait until January to schedule due to her recent lumbar compression fracture.  She may follow-up if she continues to have back or leg pain despite time and conservative management.  Patient feels her questions were answered today.  Patient agrees to the plan above.    **This note was dictated using speech recognition software and was not corrected for spelling or grammatical errors**    Jimmie Vera PA-C  Department of Orthopaedic Surgery  11:40 AM  12/03/24    [address] [place] [address]    Voicemail: [phone]   Appts: [phone]  Fax: [phone]

## 2024-12-06 ENCOUNTER — HOSPITAL ENCOUNTER (OUTPATIENT)
Dept: RADIOLOGY | Facility: CLINIC | Age: 74
Discharge: HOME | End: 2024-12-06
Payer: COMMERCIAL

## 2024-12-06 DIAGNOSIS — Z78.0 ASYMPTOMATIC MENOPAUSE: ICD-10-CM

## 2024-12-06 PROCEDURE — 77080 DXA BONE DENSITY AXIAL: CPT

## 2024-12-10 ENCOUNTER — TELEPHONE (OUTPATIENT)
Dept: PRIMARY CARE | Facility: CLINIC | Age: 74
End: 2024-12-10
Payer: COMMERCIAL

## 2024-12-10 DIAGNOSIS — I10 BENIGN ESSENTIAL HTN: ICD-10-CM

## 2024-12-10 NOTE — TELEPHONE ENCOUNTER
Last seen 09/09/24  Needs a new rx for   amLODIPine (Norvasc) 5 mg tablet   [id number]     Phone: [phone]   Fax: [phone] ty

## 2024-12-13 RX ORDER — AMLODIPINE BESYLATE 5 MG/1
10 TABLET ORAL DAILY
Qty: 90 TABLET | Refills: 3 | Status: SHIPPED | OUTPATIENT
Start: 2024-12-13 | End: 2025-12-08

## 2024-12-26 ENCOUNTER — HOSPITAL ENCOUNTER (OUTPATIENT)
Dept: RADIOLOGY | Facility: CLINIC | Age: 74
Discharge: HOME | End: 2024-12-26
Payer: COMMERCIAL

## 2024-12-26 VITALS — HEIGHT: 59 IN | BODY MASS INDEX: 32.25 KG/M2 | WEIGHT: 160 LBS

## 2024-12-26 DIAGNOSIS — Z12.31 ENCOUNTER FOR SCREENING MAMMOGRAM FOR BREAST CANCER: ICD-10-CM

## 2024-12-26 PROCEDURE — 77067 SCR MAMMO BI INCL CAD: CPT

## 2024-12-31 NOTE — PATIENT INSTRUCTIONS
HTN  - Your blood pressure is at goal today- goal is less than 130/80  - Continue your current medications  - Weight loss can help lower your bp!  Work on a healthy whole food diet and add at least 30min of exercise 5 days per week  - Work on a low salt diet       Leg pain and swelling  - vascular referal  - vascular team  - add ASA    Ankle pain  - xray  - referral for podiatry    Ordered mammogram    Labs today      Shingrix can get from your pharm          Please follow up in 3months for medicare wellness or as needed.       ** If labs or imaging ordered at today's visit, all the non-urgent results will be discussed at your next visit    If you have been referred for a special test or to a specialist please call  [phone] to schedule an appointment.  If you have any further questions, or if develop new or worsened symptoms, please give our office a call at [phone].         Ways to Help Prevent Falls at Home    Quick Tips   ? Ask for help if you need it. Most people want to help!   ? Get up slowly after sitting or laying down   ? Wear a medical alert device or keep cell phone in your pocket   ? Use night lights, especially areas near a bathroom   ? Keep the items you use often within reach on a small stool or end table   ? Use an assistive device such as walker or cane, as directed by provider/physical therapy   ? Use a non-slip mat and grab bars in your bathroom. Look for home health sections for best options     Other Areas to Focus On   ? Exercise and nutrition: Regular exercise or taking a falls prevention class are great ways improve strength and balance. Don’t forget to stay hydrated and bring a snack!   ? Medicine side effects: Some medicines can make you sleepy or dizzy, which could cause a fall. Ask your healthcare provider about the side effects your medicines could cause. Be sure to let them know if you take any vitamins or supplements as well.   ? Tripping hazards: Remove items you could  trip on, such as loose mats, rugs, cords, and clutter. Wear closed toe shoes with rubber soles.   ? Health and wellness: Get regular checkups with your healthcare provider, plus routine vision and hearing screenings. Talk with your healthcare provider about:   o Your medicines and the possible side effects - bring them in a bag if that is easier!   o Problems with balance or feeling dizzy   o Ways to promote bone health, such as Vitamin D and calcium supplements   o Questions or concerns about falling     *Ask your healthcare team if you have questions     ©[facility], 2022    [employer] Safety Interventions

## 2025-01-31 DIAGNOSIS — I10 BENIGN ESSENTIAL HTN: ICD-10-CM

## 2025-01-31 RX ORDER — LOSARTAN POTASSIUM 50 MG/1
50 TABLET ORAL DAILY
Qty: 90 TABLET | Refills: 0 | Status: SHIPPED | OUTPATIENT
Start: 2025-01-31 | End: 2025-05-01

## 2025-02-12 ENCOUNTER — EVALUATION (OUTPATIENT)
Dept: PHYSICAL THERAPY | Facility: CLINIC | Age: 75
End: 2025-02-12
Payer: MEDICARE

## 2025-02-12 DIAGNOSIS — M54.16 LUMBAR RADICULOPATHY: Primary | ICD-10-CM

## 2025-02-12 PROCEDURE — 97110 THERAPEUTIC EXERCISES: CPT | Mod: GP

## 2025-02-12 PROCEDURE — 97161 PT EVAL LOW COMPLEX 20 MIN: CPT | Mod: GP

## 2025-02-12 ASSESSMENT — ENCOUNTER SYMPTOMS
OCCASIONAL FEELINGS OF UNSTEADINESS: 0
DEPRESSION: 0
LOSS OF SENSATION IN FEET: 0

## 2025-02-12 NOTE — PROGRESS NOTES
Physical Therapy Evaluation    Patient Name: Katerina Joyner  MRN: [id number]  Today's Date: 2/12/2025  Time Calculation  Start Time: 0947  Stop Time: 1025  Time Calculation (min): 38 min  PT Evaluation Time Entry  PT Evaluation (Low) Time Entry: 20  PT Therapeutic Procedures Time Entry  Therapeutic Exercise Time Entry: 18        Insurance: [employer]  Plan of Care: 2/12/25 to 5/13/25  Visit #1    Referring MD Jimmie Vera  Imaging Performed X-rays showed Mild compression fracture L1 vertebral body, age indeterminate.  Correlate with focal tenderness and consider follow-up with MRI as warranted.    Assessment     Katerina Joyner is a 75 y.o. referred for lumbar radicular sx in [facility]. Patient demonstrates decreased lumbar AROM, decreased LLE strength, altered gait mechanics, and pain. At this time, patient is limited with walking, standing, negotiating steps, getting in and out of the car, and turning over in bed. Patient will benefit from physical therapy services to address stated impairments and improve functional mobility.    Plan  Treatment/Interventions: Cryotherapy, Education/ Instruction, Gait training, Hot pack, Manual therapy, Neuromuscular re-education, Self care/ home management, Therapeutic activities, Therapeutic exercises  PT Plan: Skilled PT  PT Frequency:  (every other week)  Duration: 6-8 visits  Onset Date: 08/01/24  Certification Period Start Date: 02/12/25  Certification Period End Date: 05/13/25  Number of Treatments Authorized: auth needed  Rehab Potential: Good  Plan of Care Agreement: Patient    Primary diagnosis: Lumbar radiculopathy  Current Problem  1. Lumbar radiculopathy  Referral to Physical Therapy    Follow Up In Physical Therapy          General:  General  Reason for Referral: Lumbar radiculopathy  Referred By: jimmie vera  Preferred Learning Style: verbal, visual, written  Precautions:  Precautions  STEADI Fall Risk Score (The score of 4 or more indicates an increased risk  of falling): 1  Precautions Comment: HBP, uterine cancer hx  Vital Signs:       Subjective:  Chief complaints: Back and L leg pain  Onset/Surgery Date: 6 months  Mechanism of Injury: Insidious onset  Previous History: No history of this problem; R ankle hairline fracture  Personal Factors that may impact care: HBP, hx of uterine cancer     Pain:  Current: 7/10 Best: 0/10 Worst: 8-9/10   Location: L leg, buttock down to calf   Type: throbbing   Aggravators: walking, lying, walking in grocery stores, stairs   Alleviators: stretching, aleve   Numbness/tingling? Yes intermittent    Function:   Work/Recreation: Retired   Prior Level: Full   Current limitations: walking, turning over, walking in grocery stores, getting in and out of the car   Condition: Unchanged     Home Situation: apartment   Stairs: yes   Lives alone   No concerns about home set up    Any falls in the past year No     Injuries? N/a    Fear of falling? yes    Sleep:    Getting to sleep No   Disturbed No   Preferred position(s): sidelying      Goals for Therapy:    Figure out what's going on    Objective    ROM/Flexibility:    Lumbar  Flexion: 80, pulling      Extension: 15, pain down the leg      Sidebend R / L: 15 / 15, pain down leg      Rotation R / L: 50% / 50%       Strength R / L:     Hip Flexion:     4+ / 4    Hip Extension:     4 / 4    Hip Abduction:    4+ / 4    Hip Adduction:    5 / 5    Knee Extension:   5 / 5    Knee Flexion:       4+ / 4    Ankle DF:             5 / 5    Ankle PF:              5 / 5     Neurological: Sensation is intact and symmetrical in BLEs.      Gait: Decreased L stance time, decreased L hip extension     Special Tests:     Slump R / L : - / -     SLR R / L : - / -      Outcome Measure:    STEFANIA: 21 / 50 = 42 %      Treatment:  Therapeutic Exercise:   LE's elevated on stool x 4 min   PPT w/LE's elevated 3 sec x 10   PPT 3 sec x 10   SKC 5 sec x 10 ea    LTR x 10   Seated lumbar flexion stretch 5 sec x  10    Goals:  Active       PT Problem       Pt will be able to perform lumbar ROM without shooting pain down L leg to indicate decreased nerve irritation in lumbar spine.        Start:  25    Expected End:  25            Pt will ambulate 150 feet in clinic with normal gait mechanics to indicate decreased pain, improved lumbar mobility.        Start:  25    Expected End:  25            Pt will increase BLE strength to 5/5 in weakened muscle groups for improved lumbopelvic stability for better stair negotiation.        Start:  25    Expected End:  25            Patient will be independent with home exercise program for home maintenance.        Start:  25    Expected End:  25                  Insurance Authorization Information  Date of Evaluation: 25    Onset Date: 2024    Referring Physician: Jimmie Vera     Surgery in the Last 3 months:  no    CPT Codes: Therapeutic Exercise: 48208, Therapeutic Activity: 53150, Neuromuscular Re-Education: 28372, Manual Therapy: 34155, and Self-Care/Home Management Trainin    Diagnosis:   Problem List Items Addressed This Visit    None  Visit Diagnoses         Codes    Lumbar radiculopathy    -  Primary M54.16    Relevant Orders    Follow Up In Physical Therapy               Functional Outcome:  Other Measures  Oswestry Disablity Index (STEFANIA): 21    OT / PT Evaluation complexity:  low    Which of the following best describes the primary reason of therapy: Improving, restoring, or adapting functional mobility or skills    Visits Requested: 10    Date Range: 90 days    Select all conditions that apply: Unknown     Елена Solis, PT

## 2025-03-04 ENCOUNTER — APPOINTMENT (OUTPATIENT)
Dept: PHYSICAL THERAPY | Facility: CLINIC | Age: 75
End: 2025-03-04
Payer: MEDICARE

## 2025-03-11 ENCOUNTER — HOSPITAL ENCOUNTER (OUTPATIENT)
Dept: RADIOLOGY | Facility: CLINIC | Age: 75
Discharge: HOME | End: 2025-03-11
Payer: MEDICARE

## 2025-03-11 ENCOUNTER — APPOINTMENT (OUTPATIENT)
Dept: PRIMARY CARE | Facility: CLINIC | Age: 75
End: 2025-03-11
Payer: COMMERCIAL

## 2025-03-11 VITALS
HEIGHT: 59 IN | DIASTOLIC BLOOD PRESSURE: 80 MMHG | RESPIRATION RATE: 14 BRPM | SYSTOLIC BLOOD PRESSURE: 120 MMHG | BODY MASS INDEX: 31.25 KG/M2 | TEMPERATURE: 97 F | OXYGEN SATURATION: 97 % | WEIGHT: 155 LBS | HEART RATE: 71 BPM

## 2025-03-11 DIAGNOSIS — I10 BENIGN ESSENTIAL HTN: Primary | ICD-10-CM

## 2025-03-11 DIAGNOSIS — K50.919 CROHN'S DISEASE WITH COMPLICATION, UNSPECIFIED GASTROINTESTINAL TRACT LOCATION: ICD-10-CM

## 2025-03-11 DIAGNOSIS — M25.571 CHRONIC PAIN OF RIGHT ANKLE: ICD-10-CM

## 2025-03-11 DIAGNOSIS — E03.8 SUBCLINICAL HYPOTHYROIDISM: ICD-10-CM

## 2025-03-11 DIAGNOSIS — G89.29 CHRONIC PAIN OF RIGHT ANKLE: ICD-10-CM

## 2025-03-11 DIAGNOSIS — R00.2 HEART PALPITATIONS: ICD-10-CM

## 2025-03-11 DIAGNOSIS — E78.5 HYPERLIPIDEMIA, UNSPECIFIED HYPERLIPIDEMIA TYPE: ICD-10-CM

## 2025-03-11 DIAGNOSIS — R42 DIZZINESS: ICD-10-CM

## 2025-03-11 DIAGNOSIS — N18.31 CHRONIC KIDNEY DISEASE, STAGE 3A (MULTI): ICD-10-CM

## 2025-03-11 DIAGNOSIS — M54.16 LUMBAR RADICULOPATHY: ICD-10-CM

## 2025-03-11 DIAGNOSIS — R06.02 SOB (SHORTNESS OF BREATH): ICD-10-CM

## 2025-03-11 PROCEDURE — 3078F DIAST BP <80 MM HG: CPT | Performed by: FAMILY MEDICINE

## 2025-03-11 PROCEDURE — 73610 X-RAY EXAM OF ANKLE: CPT | Mod: RT

## 2025-03-11 PROCEDURE — 3008F BODY MASS INDEX DOCD: CPT | Performed by: FAMILY MEDICINE

## 2025-03-11 PROCEDURE — 3074F SYST BP LT 130 MM HG: CPT | Performed by: FAMILY MEDICINE

## 2025-03-11 PROCEDURE — 73610 X-RAY EXAM OF ANKLE: CPT | Mod: RIGHT SIDE | Performed by: STUDENT IN AN ORGANIZED HEALTH CARE EDUCATION/TRAINING PROGRAM

## 2025-03-11 PROCEDURE — 1123F ACP DISCUSS/DSCN MKR DOCD: CPT | Performed by: FAMILY MEDICINE

## 2025-03-11 PROCEDURE — G2211 COMPLEX E/M VISIT ADD ON: HCPCS | Performed by: FAMILY MEDICINE

## 2025-03-11 PROCEDURE — 1036F TOBACCO NON-USER: CPT | Performed by: FAMILY MEDICINE

## 2025-03-11 PROCEDURE — 1160F RVW MEDS BY RX/DR IN RCRD: CPT | Performed by: FAMILY MEDICINE

## 2025-03-11 PROCEDURE — 1159F MED LIST DOCD IN RCRD: CPT | Performed by: FAMILY MEDICINE

## 2025-03-11 PROCEDURE — 99214 OFFICE O/P EST MOD 30 MIN: CPT | Performed by: FAMILY MEDICINE

## 2025-03-11 RX ORDER — GABAPENTIN 300 MG/1
300 CAPSULE ORAL NIGHTLY
Qty: 90 CAPSULE | Refills: 0 | Status: SHIPPED | OUTPATIENT
Start: 2025-03-11 | End: 2025-06-09

## 2025-03-11 RX ORDER — ATORVASTATIN CALCIUM 80 MG/1
80 TABLET, FILM COATED ORAL DAILY
Qty: 90 TABLET | Refills: 0 | Status: SHIPPED | OUTPATIENT
Start: 2025-03-11

## 2025-03-11 ASSESSMENT — PATIENT HEALTH QUESTIONNAIRE - PHQ9
1. LITTLE INTEREST OR PLEASURE IN DOING THINGS: NOT AT ALL
SUM OF ALL RESPONSES TO PHQ9 QUESTIONS 1 AND 2: 0
2. FEELING DOWN, DEPRESSED OR HOPELESS: NOT AT ALL

## 2025-03-11 NOTE — PROGRESS NOTES
"Subjective   Patient ID: Katerina Joyner is a 75 y.o. female who presents for Follow-up (HTN).    HPI     HTN  BP at goal today in office.  Using medications without issues.  Denies CP, SOB, palpitations, change in vision, dizziness, N/V.    Back pain  Getting PT  Has been with leg pain as well    Getting dizziness with standing with walking  As well as JUNIOR and palpitations with heavy activity  Denies CP,  change in vision, N/V    Ankle pain  Was with fracture in the past ( 10+yrs ago)- since then has been with pain. Pain worsened over the  years  Unable to wear heels  Hard to walk bare foot    Review of Systems  All systems reviewed and neg if not noted in the HPI above       Objective   /60 (Patient Position: Sitting)   Pulse 77   Temp 36.1 °C (97 °F)   Resp 14   Ht 1.499 m (4' 11\")   Wt 70.3 kg (155 lb)   SpO2 97%   BMI 31.31 kg/m²     Physical Exam  Constitutional:       Appearance: Normal appearance.   HENT:      Head: Normocephalic.      Right Ear: External ear normal.      Left Ear: External ear normal.      Nose: Nose normal.      Mouth/Throat:      Pharynx: Oropharynx is clear.   Eyes:      Extraocular Movements: Extraocular movements intact.   Neck:      Thyroid: No thyroid mass, thyromegaly or thyroid tenderness.      Vascular: No carotid bruit.   Cardiovascular:      Rate and Rhythm: Normal rate and regular rhythm.      Heart sounds: Normal heart sounds. No murmur heard.  Pulmonary:      Effort: Pulmonary effort is normal. No respiratory distress.      Breath sounds: Normal breath sounds. No wheezing.   Abdominal:      General: Bowel sounds are normal.      Palpations: Abdomen is soft. There is no mass.      Tenderness: There is no abdominal tenderness.   Musculoskeletal:         General: Normal range of motion.      Cervical back: Normal range of motion.      Right lower leg: No edema.      Left lower leg: No edema.   Skin:     General: Skin is warm and dry.      Findings: No rash. "   Neurological:      General: No focal deficit present.      Mental Status: She is alert and oriented to person, place, and time.      Motor: No weakness.   Psychiatric:         Mood and Affect: Mood normal.         Behavior: Behavior normal.         Assessment/Plan   Assessment & Plan  Hyperlipidemia, unspecified hyperlipidemia type    Orders:    Comprehensive Metabolic Panel; Future    Lipid Panel; Future    atorvastatin (Lipitor) 80 mg tablet; Take 1 tablet (80 mg) by mouth once daily.    Lumbar radiculopathy    Orders:    gabapentin (Neurontin) 300 mg capsule; Take 1 capsule (300 mg) by mouth once daily at bedtime.    Dizziness    Orders:    Comprehensive Metabolic Panel; Future    CBC and Auto Differential; Future    Echocardiogram Stress Test; Future    Subclinical hypothyroidism    Orders:    TSH with reflex to Free T4 if abnormal; Future    Benign essential HTN    Orders:    Comprehensive Metabolic Panel; Future    CBC and Auto Differential; Future    Lipid Panel; Future    TSH with reflex to Free T4 if abnormal; Future    Crohn's disease with complication, unspecified gastrointestinal tract location  Has been with flair  Referralfor GI    Orders:    Referral to Gastroenterology; Future    Chronic kidney disease, stage 3a (Multi)  Stablt  GFR 59  Rehcecking today         SOB (shortness of breath)    Orders:    Echocardiogram Stress Test; Future    Heart palpitations    Orders:    Echocardiogram Stress Test; Future    Chronic pain of right ankle    Orders:    XR ankle right 3+ views; Future    Referral to Orthopedics and Sports Medicine; Future        Please follow up in   -as scheduled in Sept   or as needed.

## 2025-03-11 NOTE — PATIENT INSTRUCTIONS
Back pain  - Starting on darryl  - Continue with PT  Topical creams- Voltaren gel, Salonpas, Icy hot, Bio freeze, Capsaicin, Asper cream, or Tiger balm (these are all over the counter)   - Tylenol 500-1000mg every 8hrs as needed    Dizziness/ SOB/palpitations   - Check ortho static BP- was normal  - Labs today  - Ordered Stress ECHO  - may need a holter  - To call EYE team for follow up with new glasses    HTN  - Your blood pressure is at goal today- goal is less than 130/80  - Continue your current medications  - Weight loss can help lower your bp!  Work on a healthy whole food diet and add at least 30min of exercise 5 days per week  - Work on a low salt diet    Kidneys  - recheck labs  - Avoid all NSAIDS - (nonsteroidal anti-inflammatory drugs) which can lower your kidney function. These include:  -Ibuprofen (Advil, Motrin, Nuprin)   -Ketoprofen (Actron, Orudis)   -Naproxen (Aleve, Naprosyn)  -Indocin/indomethacin  -Voltaren  Instead, you can use Tylenol (Acetaminophen) 500-1000mg every 4-6 hrs as needed for pain or fever reduction.   - ok to use Topical creams- Voltaren gel, Salonpas, Icy hot, Bio freeze, Capsaicin, Asper cream, or Tiger balm (these are all over the counter)     Ankle  - xray  - ortho referral  Topical creams- Voltaren gel, Salonpas, Icy hot, Bio freeze, Capsaicin, Asper cream, or Tiger balm (these are all over the counter)       Please follow up in   -as scheduled in Sept   or as needed.       ** If labs or imaging ordered at today's visit, all the non-urgent results will be discussed at your next visit    If you have been referred for a special test or to a specialist please call  [phone] to schedule an appointment.  If you have any further questions, or if develop new or worsened symptoms, please give our office a call at [phone].

## 2025-03-11 NOTE — ASSESSMENT & PLAN NOTE
Orders:    Comprehensive Metabolic Panel; Future    Lipid Panel; Future    atorvastatin (Lipitor) 80 mg tablet; Take 1 tablet (80 mg) by mouth once daily.

## 2025-03-11 NOTE — ASSESSMENT & PLAN NOTE
Orders:    Comprehensive Metabolic Panel; Future    CBC and Auto Differential; Future    Echocardiogram Stress Test; Future

## 2025-03-12 LAB
ALBUMIN SERPL-MCNC: 4.6 G/DL (ref 3.6–5.1)
ALP SERPL-CCNC: 75 U/L (ref 37–153)
ALT SERPL-CCNC: 20 U/L (ref 6–29)
ANION GAP SERPL CALCULATED.4IONS-SCNC: 11 MMOL/L (CALC) (ref 7–17)
AST SERPL-CCNC: 21 U/L (ref 10–35)
BASOPHILS # BLD AUTO: 22 CELLS/UL (ref 0–200)
BASOPHILS NFR BLD AUTO: 0.4 %
BILIRUB SERPL-MCNC: 1 MG/DL (ref 0.2–1.2)
BUN SERPL-MCNC: 21 MG/DL (ref 7–25)
CALCIUM SERPL-MCNC: 9.6 MG/DL (ref 8.6–10.4)
CHLORIDE SERPL-SCNC: 103 MMOL/L (ref 98–110)
CHOLEST SERPL-MCNC: 270 MG/DL
CHOLEST/HDLC SERPL: 2.1 (CALC)
CO2 SERPL-SCNC: 23 MMOL/L (ref 20–32)
CREAT SERPL-MCNC: 0.99 MG/DL (ref 0.6–1)
EGFRCR SERPLBLD CKD-EPI 2021: 60 ML/MIN/1.73M2
EOSINOPHIL # BLD AUTO: 88 CELLS/UL (ref 15–500)
EOSINOPHIL NFR BLD AUTO: 1.6 %
ERYTHROCYTE [DISTWIDTH] IN BLOOD BY AUTOMATED COUNT: 13 % (ref 11–15)
GLUCOSE SERPL-MCNC: 88 MG/DL (ref 65–99)
HCT VFR BLD AUTO: 43.3 % (ref 35–45)
HDLC SERPL-MCNC: 126 MG/DL
HGB BLD-MCNC: 14 G/DL (ref 11.7–15.5)
LDLC SERPL CALC-MCNC: 130 MG/DL (CALC)
LYMPHOCYTES # BLD AUTO: 2145 CELLS/UL (ref 850–3900)
LYMPHOCYTES NFR BLD AUTO: 39 %
MCH RBC QN AUTO: 31.9 PG (ref 27–33)
MCHC RBC AUTO-ENTMCNC: 32.3 G/DL (ref 32–36)
MCV RBC AUTO: 98.6 FL (ref 80–100)
MONOCYTES # BLD AUTO: 633 CELLS/UL (ref 200–950)
MONOCYTES NFR BLD AUTO: 11.5 %
NEUTROPHILS # BLD AUTO: 2613 CELLS/UL (ref 1500–7800)
NEUTROPHILS NFR BLD AUTO: 47.5 %
NONHDLC SERPL-MCNC: 144 MG/DL (CALC)
PLATELET # BLD AUTO: 294 THOUSAND/UL (ref 140–400)
PMV BLD REES-ECKER: 9.3 FL (ref 7.5–12.5)
POTASSIUM SERPL-SCNC: 3.9 MMOL/L (ref 3.5–5.3)
PROT SERPL-MCNC: 7.7 G/DL (ref 6.1–8.1)
RBC # BLD AUTO: 4.39 MILLION/UL (ref 3.8–5.1)
SODIUM SERPL-SCNC: 137 MMOL/L (ref 135–146)
TRIGL SERPL-MCNC: 58 MG/DL
TSH SERPL-ACNC: 3.33 MIU/L (ref 0.4–4.5)
WBC # BLD AUTO: 5.5 THOUSAND/UL (ref 3.8–10.8)

## 2025-03-19 ENCOUNTER — APPOINTMENT (OUTPATIENT)
Dept: PHYSICAL THERAPY | Facility: CLINIC | Age: 75
End: 2025-03-19
Payer: MEDICARE

## 2025-03-19 ENCOUNTER — DOCUMENTATION (OUTPATIENT)
Dept: PHYSICAL THERAPY | Facility: CLINIC | Age: 75
End: 2025-03-19
Payer: MEDICARE

## 2025-03-27 ENCOUNTER — HOSPITAL ENCOUNTER (OUTPATIENT)
Dept: RADIOLOGY | Facility: HOSPITAL | Age: 75
Discharge: HOME | End: 2025-03-27
Payer: MEDICARE

## 2025-03-27 ENCOUNTER — OFFICE VISIT (OUTPATIENT)
Dept: ORTHOPEDIC SURGERY | Facility: HOSPITAL | Age: 75
End: 2025-03-27
Payer: MEDICARE

## 2025-03-27 DIAGNOSIS — M25.571 CHRONIC PAIN OF RIGHT ANKLE: ICD-10-CM

## 2025-03-27 DIAGNOSIS — M79.673 FOOT AND ANKLE PAIN: Primary | ICD-10-CM

## 2025-03-27 DIAGNOSIS — G89.29 CHRONIC PAIN OF RIGHT ANKLE: ICD-10-CM

## 2025-03-27 DIAGNOSIS — M25.579 FOOT AND ANKLE PAIN: ICD-10-CM

## 2025-03-27 DIAGNOSIS — M25.579 FOOT AND ANKLE PAIN: Primary | ICD-10-CM

## 2025-03-27 DIAGNOSIS — M79.673 FOOT AND ANKLE PAIN: ICD-10-CM

## 2025-03-27 PROCEDURE — L1902 AFO ANKLE GAUNTLET PRE OTS: HCPCS | Performed by: ORTHOPAEDIC SURGERY

## 2025-03-27 PROCEDURE — 73630 X-RAY EXAM OF FOOT: CPT | Mod: RT

## 2025-03-27 PROCEDURE — 1123F ACP DISCUSS/DSCN MKR DOCD: CPT | Performed by: ORTHOPAEDIC SURGERY

## 2025-03-27 PROCEDURE — 99214 OFFICE O/P EST MOD 30 MIN: CPT | Performed by: ORTHOPAEDIC SURGERY

## 2025-03-27 PROCEDURE — 73610 X-RAY EXAM OF ANKLE: CPT | Mod: RT

## 2025-03-27 NOTE — PROGRESS NOTES
"Katerina Ray    CHIEF COMPLAINT:  Chief Complaint   Patient presents with    Right Ankle - Pain         HISTORY OF PRESENT ILLNESS:  This is a 75 y.o. female who presents today with  right ankle pain. She reports a \"hairline\" fracture in the past that wasn't heeled. Feels like she is walking on the outside of her foot.   She has used a compression sleeve in the past, somewhat helps.    Has not tried orthotics, PT    Occupation: works part time  Nicotine (Smoking/Vaping) History: non-smoker  Personal or Family Hx of DVT/PE: No  Metal Allergy: No  Diabetic:   No  Last Hgba1c:   Lab Results   Component Value Date    HGBA1C 5.4 08/02/2019       Assessment/Plan:  1. Foot and ankle pain (Primary)  - XR foot right 3+ views; Future  - XR ankle right 3+ views; Future  - R foot and ankle XR ordered and reviewed    2. 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  We discussed both operative and nonoperative options for this.  At this time she would prefer to keep with nonoperative options.  We provided her with a lace up ankle brace, and a prescription for meloxicam.    I will see her back in 2 to 3 months for reevaluation.    Thank you for the opportunity to participate in this patient's care.    Physical Exam:  Well appearing female in no acute distress; Alert and oriented.  Left Lower Extremity:   Grossly intact ROM and strength, no obvious deformity.  Right  Lower Extremity:  Gait Cycle:  Antalgic  Inspection:  Swelling: Yes Redness: No  Ecchymosis: No Effusion: No    Alignment: varus heel  Pain on palpation:   Sinus Tarsi  ROM: normal  Strength: normal  Stability:  is stable  Neurologic Status:  Sensation to all 4 compartments of lower extremity are grossly intact to light touch today in the office.  Vascular Status:   Tibialis posterior pulse: present  Dorsalis pedis pulse: present  Skin:  Normal      IMAGING:     Imaging was " ordered today. Final results and radiologist's interpretation, available in the [facility] health record. Images were reviewed with the patient/family members in the office today. My personal interpretation of the performed imaging is no acute abnormality.  Narrowing of the subtalar joint, with possible remote malunion    Berenice Rodriguez MD

## 2025-04-01 ENCOUNTER — APPOINTMENT (OUTPATIENT)
Dept: CARDIOLOGY | Facility: CLINIC | Age: 75
End: 2025-04-01
Payer: MEDICARE

## 2025-05-22 DIAGNOSIS — I10 BENIGN ESSENTIAL HTN: ICD-10-CM

## 2025-05-22 NOTE — TELEPHONE ENCOUNTER
Patient called needs refill on losartan (Cozaar) 50 mg tablet     [id number] - [place], [place] - [address] AT [facility] Phone: [phone]   Fax: [phone]        Thank you   
2

## 2025-05-28 RX ORDER — LOSARTAN POTASSIUM 50 MG/1
50 TABLET ORAL DAILY
Qty: 90 TABLET | Refills: 3 | Status: SHIPPED | OUTPATIENT
Start: 2025-05-28 | End: 2025-08-26

## 2025-09-10 ENCOUNTER — APPOINTMENT (OUTPATIENT)
Dept: PRIMARY CARE | Facility: CLINIC | Age: 75
End: 2025-09-10
Payer: COMMERCIAL